# Patient Record
Sex: FEMALE | Race: WHITE | URBAN - METROPOLITAN AREA
[De-identification: names, ages, dates, MRNs, and addresses within clinical notes are randomized per-mention and may not be internally consistent; named-entity substitution may affect disease eponyms.]

---

## 2018-11-17 ENCOUNTER — OUTPATIENT (OUTPATIENT)
Dept: OUTPATIENT SERVICES | Facility: HOSPITAL | Age: 63
LOS: 1 days | Discharge: HOME | End: 2018-11-17

## 2018-11-17 DIAGNOSIS — E55.9 VITAMIN D DEFICIENCY, UNSPECIFIED: ICD-10-CM

## 2018-11-17 DIAGNOSIS — D64.9 ANEMIA, UNSPECIFIED: ICD-10-CM

## 2018-11-17 DIAGNOSIS — N18.2 CHRONIC KIDNEY DISEASE, STAGE 2 (MILD): ICD-10-CM

## 2018-11-17 DIAGNOSIS — E78.00 PURE HYPERCHOLESTEROLEMIA, UNSPECIFIED: ICD-10-CM

## 2018-11-17 DIAGNOSIS — K76.89 OTHER SPECIFIED DISEASES OF LIVER: ICD-10-CM

## 2018-11-17 DIAGNOSIS — N39.0 URINARY TRACT INFECTION, SITE NOT SPECIFIED: ICD-10-CM

## 2018-11-17 DIAGNOSIS — E11.9 TYPE 2 DIABETES MELLITUS WITHOUT COMPLICATIONS: ICD-10-CM

## 2018-12-14 ENCOUNTER — OUTPATIENT (OUTPATIENT)
Dept: OUTPATIENT SERVICES | Facility: HOSPITAL | Age: 63
LOS: 1 days | Discharge: HOME | End: 2018-12-14

## 2018-12-14 DIAGNOSIS — Z12.31 ENCOUNTER FOR SCREENING MAMMOGRAM FOR MALIGNANT NEOPLASM OF BREAST: ICD-10-CM

## 2019-12-21 ENCOUNTER — OUTPATIENT (OUTPATIENT)
Dept: OUTPATIENT SERVICES | Facility: HOSPITAL | Age: 64
LOS: 1 days | Discharge: HOME | End: 2019-12-21

## 2019-12-21 DIAGNOSIS — K76.89 OTHER SPECIFIED DISEASES OF LIVER: ICD-10-CM

## 2019-12-21 DIAGNOSIS — D64.9 ANEMIA, UNSPECIFIED: ICD-10-CM

## 2019-12-21 DIAGNOSIS — E55.9 VITAMIN D DEFICIENCY, UNSPECIFIED: ICD-10-CM

## 2019-12-21 DIAGNOSIS — N18.2 CHRONIC KIDNEY DISEASE, STAGE 2 (MILD): ICD-10-CM

## 2019-12-21 DIAGNOSIS — N39.0 URINARY TRACT INFECTION, SITE NOT SPECIFIED: ICD-10-CM

## 2019-12-21 DIAGNOSIS — E78.00 PURE HYPERCHOLESTEROLEMIA, UNSPECIFIED: ICD-10-CM

## 2020-01-15 ENCOUNTER — OUTPATIENT (OUTPATIENT)
Dept: OUTPATIENT SERVICES | Facility: HOSPITAL | Age: 65
LOS: 1 days | Discharge: HOME | End: 2020-01-15
Payer: COMMERCIAL

## 2020-01-15 DIAGNOSIS — Z12.31 ENCOUNTER FOR SCREENING MAMMOGRAM FOR MALIGNANT NEOPLASM OF BREAST: ICD-10-CM

## 2020-01-15 PROCEDURE — 77067 SCR MAMMO BI INCL CAD: CPT | Mod: 26

## 2020-01-15 PROCEDURE — 77063 BREAST TOMOSYNTHESIS BI: CPT | Mod: 26

## 2020-12-07 ENCOUNTER — EMERGENCY (EMERGENCY)
Facility: HOSPITAL | Age: 65
LOS: 0 days | Discharge: HOME | End: 2020-12-07
Attending: EMERGENCY MEDICINE | Admitting: EMERGENCY MEDICINE
Payer: COMMERCIAL

## 2020-12-07 VITALS
RESPIRATION RATE: 18 BRPM | SYSTOLIC BLOOD PRESSURE: 178 MMHG | OXYGEN SATURATION: 96 % | TEMPERATURE: 98 F | HEART RATE: 82 BPM | DIASTOLIC BLOOD PRESSURE: 76 MMHG
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
## 2020-12-07 DIAGNOSIS — Z23 ENCOUNTER FOR IMMUNIZATION: ICD-10-CM

## 2020-12-07 DIAGNOSIS — S61.214A LACERATION WITHOUT FOREIGN BODY OF RIGHT RING FINGER WITHOUT DAMAGE TO NAIL, INITIAL ENCOUNTER: ICD-10-CM
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
## 2020-12-07 PROCEDURE — 99283 EMERGENCY DEPT VISIT LOW MDM: CPT | Mod: 25

## 2020-12-07 PROCEDURE — 12001 RPR S/N/AX/GEN/TRNK 2.5CM/<: CPT

## 2020-12-07 RX ORDER — TETANUS TOXOID, REDUCED DIPHTHERIA TOXOID AND ACELLULAR PERTUSSIS VACCINE, ADSORBED 5; 2.5; 8; 8; 2.5 [IU]/.5ML; [IU]/.5ML; UG/.5ML; UG/.5ML; UG/.5ML
0.5 SUSPENSION INTRAMUSCULAR ONCE
Refills: 0 | Status: COMPLETED | OUTPATIENT
Start: 2020-12-07 | End: 2020-12-07

## 2020-12-07 RX ADMIN — TETANUS TOXOID, REDUCED DIPHTHERIA TOXOID AND ACELLULAR PERTUSSIS VACCINE, ADSORBED 0.5 MILLILITER(S): 5; 2.5; 8; 8; 2.5 SUSPENSION INTRAMUSCULAR at 12:18

## 2020-12-07 NOTE — ED PROVIDER NOTE - NSFOLLOWUPINSTRUCTIONS_ED_ALL_ED_FT
YOU WERE SEEN IN THE EMERGENCY DEPARTMENT FOR LACERATION OF YOUR FINGER AND RECEIVED A TOTAL OF 4 SUTURES TO CLOSE THE WOUND. PLEASE SEE YOUR PCP OR RETURN TO THE ED IN 7-10 DAYS TO HAVE THE SUTURES REMOVED. THANK YOU.  --------------------------------  Laceration    A laceration is a cut that goes through all of the layers of the skin and into the tissue that is right under the skin. Some lacerations heal on their own. Others need to be closed with stitches (sutures), staples, skin adhesive strips, or skin glue. Proper laceration care minimizes the risk of infection and helps the laceration to heal better.     SEEK IMMEDIATE MEDICAL CARE IF YOU HAVE THE FOLLOWING SYMPTOMS: swelling around the wound, worsening pain, drainage from the wound, red streaking going away from your wound, inability to move finger or toe near the laceration, or discoloration of skin near the laceration.

## 2020-12-07 NOTE — ED PROVIDER NOTE - OBJECTIVE STATEMENT
Pt is a 65F with no significant pmhx presenting with laceration. Pt states she was putting away her zoodle (zucchini noodle) maker which is essentially a cheese grater this morning when she received a small, clean, simple laceration to her right 4th digit distal phalanx on the volar medial aspect. Pt irrigated lac at home vigorously and applied pressure using clean paper towels for 2h but states that there was still a small amount of oozing blood from the laceration and her  told her she needed stitches, prompting her presentation to the ED. No numbness tingling, full ROM, no other lacerations, pt feels well with minimal serosanguinous seepage at this point.

## 2020-12-07 NOTE — ED PROVIDER NOTE - PHYSICAL EXAMINATION
CONSTITUTIONAL: Well-developed; well-nourished; in no acute distress.   SKIN: warm, dry  HEAD: Normocephalic; atraumatic.  EYES: PERRL, EOMI, normal sclera and conjunctiva   ENT: No nasal discharge; airway clear.  NECK: Supple; non tender.  CARD: S1, S2 normal; no murmurs, gallops, or rubs. Regular rate and rhythm.   RESP: No wheezes, rales or rhonchi.  ABD: soft ntnd  EXT: + R 4th digit distal phalanx ventrolateral superficial laceration in Z shape, approx 1.5cm long, no tendon involvement, full ROM, no numbness tingling, with active serosanguinous drainage from wound. Clean, no foreign body, wound base visualized. Normal ROM.  No clubbing, cyanosis or edema.   LYMPH: No acute cervical adenopathy.  NEURO: Alert, oriented, grossly unremarkable  PSYCH: Cooperative, appropriate.

## 2020-12-07 NOTE — ED PROVIDER NOTE - NS ED ROS FT
Eyes:  No visual changes, eye pain or discharge.  ENMT:  No hearing changes, pain, no sore throat or runny nose, no difficulty swallowing  Cardiac:  No chest pain, SOB or edema. No chest pain with exertion.  Respiratory:  No cough or respiratory distress. No hemoptysis. No history of asthma or RAD.  GI:  No nausea, vomiting, diarrhea or abdominal pain.  :  No dysuria, frequency or burning.  MS:  + right 4th digit distal phalanx laceration, bleeding, minimal pain. No numbness/tingling, full ROM. No myalgia, muscle weakness, joint pain or back pain.  Neuro:  No headache or weakness.  No LOC.  Skin:  No skin rash.   Endocrine: No history of thyroid disease or diabetes.

## 2020-12-07 NOTE — ED PROVIDER NOTE - CARE PLAN
Principal Discharge DX:	Laceration of right ring finger without foreign body without damage to nail, initial encounter   Principal Discharge DX:	Laceration of right ring finger without foreign body without damage to nail, initial encounter  Secondary Diagnosis:	Need for Tdap vaccination

## 2020-12-07 NOTE — ED PROVIDER NOTE - PATIENT PORTAL LINK FT
You can access the FollowMyHealth Patient Portal offered by [facility] by registering at the following website: [email or web address]. By joining [facility]’s FollowMyHealth portal, you will also be able to view your health information using other applications (apps) compatible with our system.

## 2020-12-07 NOTE — ED PROVIDER NOTE - PROGRESS NOTE DETAILS
TD: lac closed with 4x 5-0 prolene simple interrupted sutures after digital block using 1% lido without epi. Pt tolerated procedure well, received tetanus shot, will f/u with pcp or in ED for suture removal in 7-10 days. Pt indicates understanding and agreement with the plan and is ready for discharge. Attending Note: 66 y/o F presents to ED with laceration to the right 4th finger sustained while using a zoodle maker. Tetanus not UTD.  On exam: (+) superficial laceration to medial aspect of right 4th finger, FROM of dip joint.  Plan: Will repair laceration and update tetanus.

## 2020-12-08 NOTE — ED PROCEDURE NOTE - CPROC ED POST PROC CARE GUIDE1
Elevate the injured extremity as instructed./Verbal/written post procedure instructions were given to patient/caregiver./Instructed patient/caregiver to follow-up with primary care physician./Instructed patient/caregiver regarding signs and symptoms of infection./Keep the cast/splint/dressing clean and dry.
Instructed patient/caregiver to follow-up with primary care physician./Keep the cast/splint/dressing clean and dry./Verbal/written post procedure instructions were given to patient/caregiver./Instructed patient/caregiver regarding signs and symptoms of infection.

## 2021-10-05 ENCOUNTER — OUTPATIENT (OUTPATIENT)
Dept: OUTPATIENT SERVICES | Facility: HOSPITAL | Age: 66
LOS: 1 days | Discharge: HOME | End: 2021-10-05
Payer: COMMERCIAL

## 2021-10-05 DIAGNOSIS — Z12.31 ENCOUNTER FOR SCREENING MAMMOGRAM FOR MALIGNANT NEOPLASM OF BREAST: ICD-10-CM

## 2021-10-05 PROCEDURE — 77067 SCR MAMMO BI INCL CAD: CPT | Mod: 26

## 2021-10-05 PROCEDURE — 77063 BREAST TOMOSYNTHESIS BI: CPT | Mod: 26

## 2022-04-15 NOTE — ED PROVIDER NOTE - CARE PROVIDER_API CALL
Gerard Castellon  [address] [facility]  [address] [place] [address]  Phone: [phone]  Fax: [phone]  Established Patient  Follow Up Time: 7-10 Days   You can access the FollowMyHealth Patient Portal offered by [facility] by registering at the following website: [email or web address]. By joining [facility]’s FollowMyHealth portal, you will also be able to view your health information using other applications (apps) compatible with our system.

## 2022-09-06 PROBLEM — Z00.00 ENCOUNTER FOR PREVENTIVE HEALTH EXAMINATION: Status: ACTIVE | Noted: 2022-09-06

## 2022-09-12 ENCOUNTER — LABORATORY RESULT (OUTPATIENT)
Age: 67
End: 2022-09-12

## 2022-09-13 ENCOUNTER — APPOINTMENT (OUTPATIENT)
Dept: OBGYN | Facility: CLINIC | Age: 67
End: 2022-09-13

## 2022-09-13 ENCOUNTER — NON-APPOINTMENT (OUTPATIENT)
Age: 67
End: 2022-09-13

## 2022-09-13 VITALS
WEIGHT: 210 LBS | SYSTOLIC BLOOD PRESSURE: 138 MMHG | DIASTOLIC BLOOD PRESSURE: 85 MMHG | BODY MASS INDEX: 37.21 KG/M2 | HEIGHT: 63 IN | TEMPERATURE: 98.6 F | HEART RATE: 111 BPM

## 2022-09-13 DIAGNOSIS — N89.8 OTHER SPECIFIED NONINFLAMMATORY DISORDERS OF VAGINA: ICD-10-CM

## 2022-09-13 DIAGNOSIS — H35.30 UNSPECIFIED MACULAR DEGENERATION: ICD-10-CM

## 2022-09-13 DIAGNOSIS — Z80.3 FAMILY HISTORY OF MALIGNANT NEOPLASM OF BREAST: ICD-10-CM

## 2022-09-13 LAB
BILIRUB UR QL STRIP: NEGATIVE
CLARITY UR: CLEAR
COLLECTION METHOD: NORMAL
GLUCOSE UR-MCNC: NEGATIVE
HCG UR QL: 0.2 EU/DL
HGB UR QL STRIP.AUTO: NORMAL
KETONES UR-MCNC: NEGATIVE
LEUKOCYTE ESTERASE UR QL STRIP: NEGATIVE
NITRITE UR QL STRIP: NEGATIVE
PH UR STRIP: 5.5
PROT UR STRIP-MCNC: NEGATIVE
SP GR UR STRIP: 1.02

## 2022-09-13 PROCEDURE — 81003 URINALYSIS AUTO W/O SCOPE: CPT | Mod: QW

## 2022-09-13 PROCEDURE — 99387 INIT PM E/M NEW PAT 65+ YRS: CPT

## 2022-09-13 NOTE — HISTORY OF PRESENT ILLNESS
[postmenopausal] : postmenopausal [Y] : Positive pregnancy history [Currently In Menopause] : currently in menopause [Post-Menopause, No Sxs] : post-menopausal, currently without symptoms [Menopause Age: ____] : age at menopause was [unfilled] [FreeTextEntry1] : new pt, annual exam\par pt thinks her bladder is prolapsed [TextBox_102] : hyst 2014   [PGxTotal] : 1 [[facility]] : 1

## 2022-09-13 NOTE — PHYSICAL EXAM
[Chaperone Present] : A chaperone was present in the examining room during all aspects of the physical examination [Appropriately responsive] : appropriately responsive [Alert] : alert [No Acute Distress] : no acute distress [Oriented x3] : oriented x3 [Examination Of The Breasts] : a normal appearance [No Masses] : no breast masses were palpable [Vulvar Atrophy] : vulvar atrophy [Labia Majora] : normal [Labia Minora] : normal [Normal] : normal [Atrophy] : atrophy [Absent] : absent [Uterine Adnexae] : absent [Discharge] : a  ~M vaginal discharge was present [Heavy] : heavy [White] : white [Thick] : thick

## 2022-09-15 LAB
APPEARANCE: ABNORMAL
BACTERIA UR CULT: NORMAL
BILIRUBIN URINE: NEGATIVE
BLOOD URINE: NEGATIVE
COLOR: NORMAL
GLUCOSE QUALITATIVE U: NEGATIVE
KETONES URINE: NORMAL
LEUKOCYTE ESTERASE URINE: NEGATIVE
NITRITE URINE: NEGATIVE
PH URINE: 6
PROTEIN URINE: NEGATIVE
SPECIFIC GRAVITY URINE: 1.02
UROBILINOGEN URINE: NORMAL

## 2022-09-22 LAB — CYTOLOGY CVX/VAG DOC THIN PREP: NORMAL

## 2022-09-29 ENCOUNTER — APPOINTMENT (OUTPATIENT)
Dept: GASTROENTEROLOGY | Facility: CLINIC | Age: 67
End: 2022-09-29

## 2022-10-12 ENCOUNTER — OUTPATIENT (OUTPATIENT)
Dept: OUTPATIENT SERVICES | Facility: HOSPITAL | Age: 67
LOS: 1 days | Discharge: HOME | End: 2022-10-12

## 2022-10-12 ENCOUNTER — RESULT REVIEW (OUTPATIENT)
Age: 67
End: 2022-10-12

## 2022-10-12 DIAGNOSIS — Z12.31 ENCOUNTER FOR SCREENING MAMMOGRAM FOR MALIGNANT NEOPLASM OF BREAST: ICD-10-CM

## 2022-10-12 PROCEDURE — 77067 SCR MAMMO BI INCL CAD: CPT | Mod: 26

## 2022-10-12 PROCEDURE — 77063 BREAST TOMOSYNTHESIS BI: CPT | Mod: 26

## 2022-11-08 ENCOUNTER — TRANSCRIPTION ENCOUNTER (OUTPATIENT)
Age: 67
End: 2022-11-08

## 2022-11-08 ENCOUNTER — OUTPATIENT (OUTPATIENT)
Dept: OUTPATIENT SERVICES | Facility: HOSPITAL | Age: 67
LOS: 1 days | Discharge: HOME | End: 2022-11-08

## 2022-11-08 VITALS
SYSTOLIC BLOOD PRESSURE: 147 MMHG | HEART RATE: 76 BPM | RESPIRATION RATE: 18 BRPM | OXYGEN SATURATION: 98 % | DIASTOLIC BLOOD PRESSURE: 60 MMHG

## 2022-11-08 VITALS
RESPIRATION RATE: 18 BRPM | DIASTOLIC BLOOD PRESSURE: 65 MMHG | SYSTOLIC BLOOD PRESSURE: 146 MMHG | HEIGHT: 63 IN | TEMPERATURE: 97 F | WEIGHT: 207.9 LBS | HEART RATE: 94 BPM

## 2022-11-08 DIAGNOSIS — Z90.710 ACQUIRED ABSENCE OF BOTH CERVIX AND UTERUS: Chronic | ICD-10-CM

## 2022-11-08 DIAGNOSIS — Z98.891 HISTORY OF UTERINE SCAR FROM PREVIOUS SURGERY: Chronic | ICD-10-CM

## 2022-11-08 NOTE — ASU DISCHARGE PLAN (ADULT/PEDIATRIC) - NS MD DC FALL RISK RISK
For information on Fall & Injury Prevention, visit: [email or web address] OR  [email or web address] OR  https://www.cdc.gov/steadi/patient.html

## 2022-11-08 NOTE — ASU PATIENT PROFILE, ADULT - FALL HARM RISK - UNIVERSAL INTERVENTIONS
Bed in lowest position, wheels locked, appropriate side rails in place/Call bell, personal items and telephone in reach/Instruct patient to call for assistance before getting out of bed or chair/Non-slip footwear when patient is out of bed/[place] to call system/Physically safe environment - no spills, clutter or unnecessary equipment/Purposeful Proactive Rounding/Room/bathroom lighting operational, light cord in reach

## 2022-11-10 DIAGNOSIS — Z12.11 ENCOUNTER FOR SCREENING FOR MALIGNANT NEOPLASM OF COLON: ICD-10-CM

## 2022-11-10 DIAGNOSIS — Z90.710 ACQUIRED ABSENCE OF BOTH CERVIX AND UTERUS: ICD-10-CM

## 2022-11-10 DIAGNOSIS — K57.30 DIVERTICULOSIS OF LARGE INTESTINE WITHOUT PERFORATION OR ABSCESS WITHOUT BLEEDING: ICD-10-CM

## 2022-11-10 DIAGNOSIS — K64.8 OTHER HEMORRHOIDS: ICD-10-CM

## 2023-06-29 PROBLEM — H35.30 UNSPECIFIED MACULAR DEGENERATION: Chronic | Status: ACTIVE | Noted: 2022-11-08

## 2023-06-29 PROBLEM — L71.9 ROSACEA, UNSPECIFIED: Chronic | Status: ACTIVE | Noted: 2022-11-08

## 2023-08-29 ENCOUNTER — APPOINTMENT (OUTPATIENT)
Dept: OPHTHALMOLOGY | Facility: CLINIC | Age: 68
End: 2023-08-29
Payer: MEDICARE

## 2023-08-29 ENCOUNTER — OUTPATIENT (OUTPATIENT)
Dept: OUTPATIENT SERVICES | Facility: HOSPITAL | Age: 68
LOS: 1 days | End: 2023-08-29
Payer: MEDICARE

## 2023-08-29 DIAGNOSIS — H25.89 OTHER AGE-RELATED CATARACT: ICD-10-CM

## 2023-08-29 DIAGNOSIS — Z98.891 HISTORY OF UTERINE SCAR FROM PREVIOUS SURGERY: Chronic | ICD-10-CM

## 2023-08-29 DIAGNOSIS — Z90.710 ACQUIRED ABSENCE OF BOTH CERVIX AND UTERUS: Chronic | ICD-10-CM

## 2023-08-29 PROCEDURE — 76516 ECHO EXAM OF EYE: CPT

## 2023-08-29 PROCEDURE — 76519 ECHO EXAM OF EYE: CPT | Mod: 26

## 2023-09-05 DIAGNOSIS — H25.13 AGE-RELATED NUCLEAR CATARACT, BILATERAL: ICD-10-CM

## 2023-09-25 ENCOUNTER — OUTPATIENT (OUTPATIENT)
Dept: OUTPATIENT SERVICES | Facility: HOSPITAL | Age: 68
LOS: 1 days | Discharge: HOME | End: 2023-09-25
Payer: MEDICARE

## 2023-09-25 VITALS
WEIGHT: 205.03 LBS | SYSTOLIC BLOOD PRESSURE: 135 MMHG | HEART RATE: 104 BPM | OXYGEN SATURATION: 99 % | RESPIRATION RATE: 17 BRPM | TEMPERATURE: 96 F | HEIGHT: 63 IN | DIASTOLIC BLOOD PRESSURE: 71 MMHG

## 2023-09-25 VITALS — HEART RATE: 83 BPM | SYSTOLIC BLOOD PRESSURE: 138 MMHG | RESPIRATION RATE: 15 BRPM | DIASTOLIC BLOOD PRESSURE: 64 MMHG

## 2023-09-25 DIAGNOSIS — Z98.891 HISTORY OF UTERINE SCAR FROM PREVIOUS SURGERY: Chronic | ICD-10-CM

## 2023-09-25 DIAGNOSIS — H25.041 POSTERIOR SUBCAPSULAR POLAR AGE-RELATED CATARACT, RIGHT EYE: ICD-10-CM

## 2023-09-25 DIAGNOSIS — Z90.710 ACQUIRED ABSENCE OF BOTH CERVIX AND UTERUS: Chronic | ICD-10-CM

## 2023-09-25 PROCEDURE — V2632: CPT

## 2023-09-25 NOTE — ASU PATIENT PROFILE, ADULT - PAIN SCALE PREFERRED, PROFILE
Patient called to ask the best way to remove tegaderm. Writer suggested mild soap and warm water. He stated otherwise he has no pain, everything is going well. He is aware to call us with further questions.   none

## 2023-09-25 NOTE — ASU PATIENT PROFILE, ADULT - FALL HARM RISK - UNIVERSAL INTERVENTIONS
Bed in lowest position, wheels locked, appropriate side rails in place/Call bell, personal items and telephone in reach/Instruct patient to call for assistance before getting out of bed or chair/Non-slip footwear when patient is out of bed/[place] to call system/Physically safe environment - no spills, clutter or unnecessary equipment/Purposeful Proactive Rounding/Room/bathroom lighting operational, light cord in reach

## 2023-09-27 DIAGNOSIS — H26.8 OTHER SPECIFIED CATARACT: ICD-10-CM

## 2023-09-27 DIAGNOSIS — Z90.710 ACQUIRED ABSENCE OF BOTH CERVIX AND UTERUS: ICD-10-CM

## 2023-10-23 ENCOUNTER — APPOINTMENT (OUTPATIENT)
Dept: OBGYN | Facility: CLINIC | Age: 68
End: 2023-10-23
Payer: MEDICARE

## 2023-10-23 VITALS
HEIGHT: 63 IN | DIASTOLIC BLOOD PRESSURE: 82 MMHG | BODY MASS INDEX: 37.03 KG/M2 | SYSTOLIC BLOOD PRESSURE: 129 MMHG | HEART RATE: 105 BPM | WEIGHT: 209 LBS

## 2023-10-23 DIAGNOSIS — Z12.39 ENCOUNTER FOR OTHER SCREENING FOR MALIGNANT NEOPLASM OF BREAST: ICD-10-CM

## 2023-10-23 DIAGNOSIS — Z78.9 OTHER SPECIFIED HEALTH STATUS: ICD-10-CM

## 2023-10-23 DIAGNOSIS — L30.4 ERYTHEMA INTERTRIGO: ICD-10-CM

## 2023-10-23 DIAGNOSIS — Z01.419 ENCOUNTER FOR GYNECOLOGICAL EXAMINATION (GENERAL) (ROUTINE) W/OUT ABNORMAL FINDINGS: ICD-10-CM

## 2023-10-23 PROCEDURE — G0101: CPT

## 2023-10-23 PROCEDURE — 81003 URINALYSIS AUTO W/O SCOPE: CPT | Mod: QW

## 2023-10-23 PROCEDURE — G0402 INITIAL PREVENTIVE EXAM: CPT

## 2023-10-23 PROCEDURE — G0439: CPT

## 2023-10-23 RX ORDER — CLOTRIMAZOLE AND BETAMETHASONE DIPROPIONATE 10; .5 MG/G; MG/G
1-0.05 CREAM TOPICAL
Qty: 1 | Refills: 1 | Status: ACTIVE | COMMUNITY
Start: 2023-10-23 | End: 1900-01-01

## 2023-10-23 RX ORDER — METRONIDAZOLE 7.5 MG/G
CREAM TOPICAL
Refills: 0 | Status: ACTIVE | COMMUNITY

## 2023-10-27 ENCOUNTER — RESULT REVIEW (OUTPATIENT)
Age: 68
End: 2023-10-27

## 2023-10-27 ENCOUNTER — OUTPATIENT (OUTPATIENT)
Dept: OUTPATIENT SERVICES | Facility: HOSPITAL | Age: 68
LOS: 1 days | End: 2023-10-27
Payer: MEDICARE

## 2023-10-27 DIAGNOSIS — Z12.31 ENCOUNTER FOR SCREENING MAMMOGRAM FOR MALIGNANT NEOPLASM OF BREAST: ICD-10-CM

## 2023-10-27 DIAGNOSIS — Z98.891 HISTORY OF UTERINE SCAR FROM PREVIOUS SURGERY: Chronic | ICD-10-CM

## 2023-10-27 DIAGNOSIS — Z90.710 ACQUIRED ABSENCE OF BOTH CERVIX AND UTERUS: Chronic | ICD-10-CM

## 2023-10-27 LAB — CYTOLOGY CVX/VAG DOC THIN PREP: NORMAL

## 2023-10-27 PROCEDURE — 77067 SCR MAMMO BI INCL CAD: CPT | Mod: 26

## 2023-10-27 PROCEDURE — 77067 SCR MAMMO BI INCL CAD: CPT

## 2023-10-27 PROCEDURE — 77063 BREAST TOMOSYNTHESIS BI: CPT

## 2023-10-27 PROCEDURE — 77063 BREAST TOMOSYNTHESIS BI: CPT | Mod: 26

## 2023-10-28 DIAGNOSIS — Z12.31 ENCOUNTER FOR SCREENING MAMMOGRAM FOR MALIGNANT NEOPLASM OF BREAST: ICD-10-CM

## 2023-12-04 ENCOUNTER — OUTPATIENT (OUTPATIENT)
Dept: OUTPATIENT SERVICES | Facility: HOSPITAL | Age: 68
LOS: 1 days | Discharge: HOME | End: 2023-12-04
Payer: MEDICARE

## 2023-12-04 VITALS — SYSTOLIC BLOOD PRESSURE: 119 MMHG | RESPIRATION RATE: 15 BRPM | DIASTOLIC BLOOD PRESSURE: 70 MMHG | HEART RATE: 94 BPM

## 2023-12-04 VITALS
OXYGEN SATURATION: 94 % | RESPIRATION RATE: 17 BRPM | TEMPERATURE: 97 F | SYSTOLIC BLOOD PRESSURE: 120 MMHG | WEIGHT: 205.03 LBS | HEART RATE: 101 BPM | HEIGHT: 63 IN | DIASTOLIC BLOOD PRESSURE: 61 MMHG

## 2023-12-04 DIAGNOSIS — Z98.891 HISTORY OF UTERINE SCAR FROM PREVIOUS SURGERY: Chronic | ICD-10-CM

## 2023-12-04 DIAGNOSIS — Z98.890 OTHER SPECIFIED POSTPROCEDURAL STATES: Chronic | ICD-10-CM

## 2023-12-04 DIAGNOSIS — Z90.710 ACQUIRED ABSENCE OF BOTH CERVIX AND UTERUS: Chronic | ICD-10-CM

## 2023-12-04 DIAGNOSIS — H25.22 AGE-RELATED CATARACT, MORGAGNIAN TYPE, LEFT EYE: ICD-10-CM

## 2023-12-04 PROCEDURE — V2632: CPT

## 2023-12-04 RX ORDER — METRONIDAZOLE 7.5 MG/G
1 GEL VAGINAL
Qty: 0 | Refills: 0 | DISCHARGE

## 2023-12-04 RX ORDER — MULTIVIT-MIN/FERROUS GLUCONATE 9 MG/15 ML
0 LIQUID (ML) ORAL
Qty: 0 | Refills: 0 | DISCHARGE

## 2023-12-04 NOTE — ASU PATIENT PROFILE, ADULT - FALL HARM RISK - UNIVERSAL INTERVENTIONS
Bed in lowest position, wheels locked, appropriate side rails in place/Call bell, personal items and telephone in reach/Instruct patient to call for assistance before getting out of bed or chair/Non-slip footwear when patient is out of bed/[place] to call system/Physically safe environment - no spills, clutter or unnecessary equipment/Purposeful Proactive Rounding/Room/bathroom lighting operational, light cord in reach Bed in lowest position, wheels locked, appropriate side rails in place/Call bell, personal items and telephone in reach/Instruct patient to call for assistance before getting out of bed or chair/Non-slip footwear when patient is out of bed/[place] to call system/Physically safe environment - no spills, clutter or unnecessary equipment/Purposeful Proactive Rounding/Room/bathroom lighting operational, light cord in reach

## 2023-12-04 NOTE — ASU DISCHARGE PLAN (ADULT/PEDIATRIC) - NS MD DC FALL RISK RISK
For information on Fall & Injury Prevention, visit: [email or web address] OR  [email or web address] OR  https://www.cdc.gov/steadi/patient.html For information on Fall & Injury Prevention, visit: [email or web address] OR  [email or web address] OR  https://www.cdc.gov/steadi/patient.html

## 2023-12-04 NOTE — ASU PATIENT PROFILE, ADULT - NSICDXPASTSURGICALHX_GEN_ALL_CORE_FT
PAST SURGICAL HISTORY:  H/O total hysterectomy     History of      History of surgery RIGHT EYE CATARACT EXTRACTION WITH LENS IMPLANT

## 2023-12-04 NOTE — CHART NOTE - NSCHARTNOTEFT_GEN_A_CORE
PACU ANESTHESIA ADMISSION NOTE      Procedure: Left eye cataract extraction  Post op diagnosis:  Left eye cataract    __x__  Patent Airway    __x__  Full return of protective reflexes    __x__  Full recovery from anesthesia / back to baseline status    Vitals:  T(C): 35.9 (12-04-23 @ 07:42), Max: 35.9 (12-04-23 @ 07:25)  HR: 94 (12-04-23 @ 09:07) (94 - 101)  BP: 119/70 (12-04-23 @ 09:07) (119/70 - 120/61)  RR: 15 (12-04-23 @ 09:07) (15 - 17)  SpO2: 94% (12-04-23 @ 07:42) (94% - 94%)    Mental Status:  __x__ Awake   ___x__ Alert   _____ Drowsy   _____ Sedated    Nausea/Vomiting:  __x__ NO  ______Yes,   See Post - Op Orders          Pain Scale (0-10):  __0___    Treatment: ____ None    __x__ See Post - Op/PCA Orders    Post - Operative Fluids:   ____ Oral   __x__ See Post - Op Orders    Plan: Discharge:   _x___Home       _____Floor     _____Critical Care    _____  Other:_________________    Comments: Patient had smooth intraoperative event, no anesthesia complication.  PACU Vital signs: HR: 88           BP:        119/70          RR: 16            O2 Sat:       96%

## 2023-12-06 DIAGNOSIS — H25.12 AGE-RELATED NUCLEAR CATARACT, LEFT EYE: ICD-10-CM

## 2023-12-06 DIAGNOSIS — H35.30 UNSPECIFIED MACULAR DEGENERATION: ICD-10-CM

## 2023-12-12 NOTE — ED PROCEDURE NOTE - CPROC ED TIME OUT STATEMENT1
Patient presents for a dental restoration and verbally consents for treatment:  Reviewed health history-  Pt is ASA type I  Treatment consents signed: Yes  Perio: Healthy  Pain Scale: 0  Caries Assessment: High    Radiographs: Films are current  Oral Hygiene instruction reviewed and given  Hygiene recall visits recommended to the patient    Patient agrees with the diagnosis of Caries and the proposed treatment plan for the resin restoration:  Tooth ##30 (OB)and #31(O)  Dental Anesthesia:  No.  Material:   Etch Ivoclar bond and resin   Shade: Shade A2  Post op instructions given  Prognosis is Good.    Referrals Needed: No  Next visit: Maryann Sherwood
“Patient's name, , procedure and correct site were confirmed during the [place] Timeout.”
“Patient's name, , procedure and correct site were confirmed during the [place] Timeout.”

## 2024-01-14 ENCOUNTER — NON-APPOINTMENT (OUTPATIENT)
Age: 69
End: 2024-01-14

## 2024-09-19 ENCOUNTER — INPATIENT (INPATIENT)
Facility: HOSPITAL | Age: 69
LOS: 5 days | Discharge: HOME HEALTH | DRG: 293 | End: 2024-09-25
Attending: STUDENT IN AN ORGANIZED HEALTH CARE EDUCATION/TRAINING PROGRAM | Admitting: STUDENT IN AN ORGANIZED HEALTH CARE EDUCATION/TRAINING PROGRAM
Payer: MEDICARE

## 2024-09-19 VITALS
RESPIRATION RATE: 18 BRPM | DIASTOLIC BLOOD PRESSURE: 72 MMHG | TEMPERATURE: 98 F | HEIGHT: 63 IN | OXYGEN SATURATION: 97 % | HEART RATE: 105 BPM | WEIGHT: 197.98 LBS | SYSTOLIC BLOOD PRESSURE: 118 MMHG

## 2024-09-19 DIAGNOSIS — I50.9 HEART FAILURE, UNSPECIFIED: ICD-10-CM

## 2024-09-19 DIAGNOSIS — Z90.710 ACQUIRED ABSENCE OF BOTH CERVIX AND UTERUS: Chronic | ICD-10-CM

## 2024-09-19 DIAGNOSIS — Z98.891 HISTORY OF UTERINE SCAR FROM PREVIOUS SURGERY: Chronic | ICD-10-CM

## 2024-09-19 DIAGNOSIS — Z98.890 OTHER SPECIFIED POSTPROCEDURAL STATES: Chronic | ICD-10-CM

## 2024-09-19 LAB
ALBUMIN SERPL ELPH-MCNC: 4.2 G/DL — SIGNIFICANT CHANGE UP (ref 3.5–5.2)
ALP SERPL-CCNC: 105 U/L — SIGNIFICANT CHANGE UP (ref 30–115)
ALT FLD-CCNC: 37 U/L — SIGNIFICANT CHANGE UP (ref 0–41)
ANION GAP SERPL CALC-SCNC: 12 MMOL/L — SIGNIFICANT CHANGE UP (ref 7–14)
ANISOCYTOSIS BLD QL: SLIGHT — SIGNIFICANT CHANGE UP
AST SERPL-CCNC: 30 U/L — SIGNIFICANT CHANGE UP (ref 0–41)
BASE EXCESS BLDV CALC-SCNC: -2 MMOL/L — SIGNIFICANT CHANGE UP (ref -2–3)
BASOPHILS # BLD AUTO: 0 K/UL — SIGNIFICANT CHANGE UP (ref 0–0.2)
BASOPHILS NFR BLD AUTO: 0 % — SIGNIFICANT CHANGE UP (ref 0–1)
BILIRUB SERPL-MCNC: 1.4 MG/DL — HIGH (ref 0.2–1.2)
BUN SERPL-MCNC: 17 MG/DL — SIGNIFICANT CHANGE UP (ref 10–20)
CA-I SERPL-SCNC: 1.27 MMOL/L — SIGNIFICANT CHANGE UP (ref 1.15–1.33)
CALCIUM SERPL-MCNC: 10 MG/DL — SIGNIFICANT CHANGE UP (ref 8.4–10.5)
CHLORIDE SERPL-SCNC: 106 MMOL/L — SIGNIFICANT CHANGE UP (ref 98–110)
CO2 SERPL-SCNC: 22 MMOL/L — SIGNIFICANT CHANGE UP (ref 17–32)
CREAT SERPL-MCNC: 0.9 MG/DL — SIGNIFICANT CHANGE UP (ref 0.7–1.5)
EGFR: 70 ML/MIN/1.73M2 — SIGNIFICANT CHANGE UP
EOSINOPHIL # BLD AUTO: 0 K/UL — SIGNIFICANT CHANGE UP (ref 0–0.7)
EOSINOPHIL NFR BLD AUTO: 0 % — SIGNIFICANT CHANGE UP (ref 0–8)
GAS PNL BLDV: 136 MMOL/L — SIGNIFICANT CHANGE UP (ref 136–145)
GAS PNL BLDV: SIGNIFICANT CHANGE UP
GAS PNL BLDV: SIGNIFICANT CHANGE UP
GIANT PLATELETS BLD QL SMEAR: PRESENT — SIGNIFICANT CHANGE UP
GLUCOSE SERPL-MCNC: 119 MG/DL — HIGH (ref 70–99)
HCO3 BLDV-SCNC: 23 MMOL/L — SIGNIFICANT CHANGE UP (ref 22–29)
HCT VFR BLD CALC: 42.5 % — SIGNIFICANT CHANGE UP (ref 37–47)
HCT VFR BLDA CALC: 42 % — SIGNIFICANT CHANGE UP (ref 34.5–46.5)
HGB BLD CALC-MCNC: 14 G/DL — SIGNIFICANT CHANGE UP (ref 11.7–16.1)
HGB BLD-MCNC: 13.7 G/DL — SIGNIFICANT CHANGE UP (ref 12–16)
LACTATE BLDV-MCNC: 1.8 MMOL/L — SIGNIFICANT CHANGE UP (ref 0.5–2)
LYMPHOCYTES # BLD AUTO: 1.52 K/UL — SIGNIFICANT CHANGE UP (ref 1.2–3.4)
LYMPHOCYTES # BLD AUTO: 23.5 % — SIGNIFICANT CHANGE UP (ref 20.5–51.1)
MACROCYTES BLD QL: SLIGHT — SIGNIFICANT CHANGE UP
MANUAL SMEAR VERIFICATION: SIGNIFICANT CHANGE UP
MCHC RBC-ENTMCNC: 32.2 G/DL — SIGNIFICANT CHANGE UP (ref 32–37)
MCHC RBC-ENTMCNC: 34.3 PG — HIGH (ref 27–31)
MCV RBC AUTO: 106.5 FL — HIGH (ref 81–99)
MONOCYTES # BLD AUTO: 0 K/UL — LOW (ref 0.1–0.6)
MONOCYTES NFR BLD AUTO: 0 % — LOW (ref 1.7–9.3)
NEUTROPHILS # BLD AUTO: 4.93 K/UL — SIGNIFICANT CHANGE UP (ref 1.4–6.5)
NEUTROPHILS NFR BLD AUTO: 76.5 % — HIGH (ref 42.2–75.2)
NT-PROBNP SERPL-SCNC: 6863 PG/ML — HIGH (ref 0–300)
OVALOCYTES BLD QL SMEAR: SLIGHT — SIGNIFICANT CHANGE UP
PCO2 BLDV: 40 MMHG — SIGNIFICANT CHANGE UP (ref 39–42)
PH BLDV: 7.37 — SIGNIFICANT CHANGE UP (ref 7.32–7.43)
PLAT MORPH BLD: NORMAL — SIGNIFICANT CHANGE UP
PLATELET # BLD AUTO: 243 K/UL — SIGNIFICANT CHANGE UP (ref 130–400)
PMV BLD: 10.3 FL — SIGNIFICANT CHANGE UP (ref 7.4–10.4)
PO2 BLDV: 28 MMHG — SIGNIFICANT CHANGE UP (ref 25–45)
POIKILOCYTOSIS BLD QL AUTO: SLIGHT — SIGNIFICANT CHANGE UP
POLYCHROMASIA BLD QL SMEAR: SLIGHT — SIGNIFICANT CHANGE UP
POTASSIUM BLDV-SCNC: 3.7 MMOL/L — SIGNIFICANT CHANGE UP (ref 3.5–5.1)
POTASSIUM SERPL-MCNC: 3.9 MMOL/L — SIGNIFICANT CHANGE UP (ref 3.5–5)
POTASSIUM SERPL-SCNC: 3.9 MMOL/L — SIGNIFICANT CHANGE UP (ref 3.5–5)
PROT SERPL-MCNC: 7 G/DL — SIGNIFICANT CHANGE UP (ref 6–8)
RBC # BLD: 3.99 M/UL — LOW (ref 4.2–5.4)
RBC # FLD: 18.5 % — HIGH (ref 11.5–14.5)
RBC BLD AUTO: ABNORMAL
SAO2 % BLDV: 39.4 % — LOW (ref 67–88)
SODIUM SERPL-SCNC: 140 MMOL/L — SIGNIFICANT CHANGE UP (ref 135–146)
TROPONIN T, HIGH SENSITIVITY RESULT: 14 NG/L — HIGH (ref 6–13)
TROPONIN T, HIGH SENSITIVITY RESULT: 15 NG/L — HIGH (ref 6–13)
WBC # BLD: 6.45 K/UL — SIGNIFICANT CHANGE UP (ref 4.8–10.8)
WBC # FLD AUTO: 6.45 K/UL — SIGNIFICANT CHANGE UP (ref 4.8–10.8)

## 2024-09-19 PROCEDURE — 93308 TTE F-UP OR LMTD: CPT | Mod: 26

## 2024-09-19 PROCEDURE — 84481 FREE ASSAY (FT-3): CPT

## 2024-09-19 PROCEDURE — 85025 COMPLETE CBC W/AUTO DIFF WBC: CPT

## 2024-09-19 PROCEDURE — 83036 HEMOGLOBIN GLYCOSYLATED A1C: CPT

## 2024-09-19 PROCEDURE — 76604 US EXAM CHEST: CPT | Mod: 26

## 2024-09-19 PROCEDURE — 76937 US GUIDE VASCULAR ACCESS: CPT | Mod: 26

## 2024-09-19 PROCEDURE — 84484 ASSAY OF TROPONIN QUANT: CPT

## 2024-09-19 PROCEDURE — C1894: CPT

## 2024-09-19 PROCEDURE — 36415 COLL VENOUS BLD VENIPUNCTURE: CPT

## 2024-09-19 PROCEDURE — 82728 ASSAY OF FERRITIN: CPT

## 2024-09-19 PROCEDURE — 80048 BASIC METABOLIC PNL TOTAL CA: CPT

## 2024-09-19 PROCEDURE — 84439 ASSAY OF FREE THYROXINE: CPT

## 2024-09-19 PROCEDURE — 99285 EMERGENCY DEPT VISIT HI MDM: CPT | Mod: FS

## 2024-09-19 PROCEDURE — 84443 ASSAY THYROID STIM HORMONE: CPT

## 2024-09-19 PROCEDURE — 80061 LIPID PANEL: CPT

## 2024-09-19 PROCEDURE — 83735 ASSAY OF MAGNESIUM: CPT

## 2024-09-19 PROCEDURE — 71046 X-RAY EXAM CHEST 2 VIEWS: CPT | Mod: 26

## 2024-09-19 PROCEDURE — 93010 ELECTROCARDIOGRAM REPORT: CPT | Mod: 76

## 2024-09-19 PROCEDURE — 82962 GLUCOSE BLOOD TEST: CPT

## 2024-09-19 PROCEDURE — 83550 IRON BINDING TEST: CPT

## 2024-09-19 PROCEDURE — 93005 ELECTROCARDIOGRAM TRACING: CPT

## 2024-09-19 PROCEDURE — 93306 TTE W/DOPPLER COMPLETE: CPT

## 2024-09-19 PROCEDURE — 99222 1ST HOSP IP/OBS MODERATE 55: CPT

## 2024-09-19 PROCEDURE — 93458 L HRT ARTERY/VENTRICLE ANGIO: CPT

## 2024-09-19 PROCEDURE — C1887: CPT

## 2024-09-19 PROCEDURE — C1769: CPT

## 2024-09-19 PROCEDURE — 85027 COMPLETE CBC AUTOMATED: CPT

## 2024-09-19 PROCEDURE — 83540 ASSAY OF IRON: CPT

## 2024-09-19 RX ORDER — FUROSEMIDE 10 MG/ML
40 INJECTION INTRAVENOUS DAILY
Refills: 0 | Status: DISCONTINUED | OUTPATIENT
Start: 2024-09-20 | End: 2024-09-22

## 2024-09-19 RX ORDER — FUROSEMIDE 10 MG/ML
40 INJECTION INTRAVENOUS ONCE
Refills: 0 | Status: COMPLETED | OUTPATIENT
Start: 2024-09-19 | End: 2024-09-19

## 2024-09-19 RX ADMIN — Medication 5000 UNIT(S): at 18:22

## 2024-09-19 RX ADMIN — FUROSEMIDE 40 MILLIGRAM(S): 10 INJECTION INTRAVENOUS at 15:08

## 2024-09-19 NOTE — H&P ADULT - NSHPLABSRESULTS_GEN_ALL_CORE
- Telemetry: continue to monitor   - ECG 9/19/24: bpm, TWI Inferolateral leads, wide QRS complex   - Echo: pending  - Radiology: 9/19/24 CXR No acute pulmonary process.     - Labs:                        13.7   6.45  )-----------( 243      ( 19 Sep 2024 12:11 )             42.5     09-19    140  |  106  |  17  ----------------------------<  119[H]  3.9   |  22  |  0.9    Ca    10.0      19 Sep 2024 12:11    TPro  7.0  /  Alb  4.2  /  TBili  1.4[H]  /  DBili  x   /  AST  30  /  ALT  37  /  AlkPhos  105  09-19    LIVER FUNCTIONS - ( 19 Sep 2024 12:11 )  Alb: 4.2 g/dL / Pro: 7.0 g/dL / ALK PHOS: 105 U/L / ALT: 37 U/L / AST: 30 U/L / GGT: x           Urinalysis Basic - ( 19 Sep 2024 12:11 )    Color: x / Appearance: x / SG: x / pH: x  Gluc: 119 mg/dL / Ketone: x  / Bili: x / Urobili: x   Blood: x / Protein: x / Nitrite: x   Leuk Esterase: x / RBC: x / WBC x   Sq Epi: x / Non Sq Epi: x / Bacteria: x

## 2024-09-19 NOTE — ED ADULT NURSE NOTE - NSFALLUNIVINTERV_ED_ALL_ED
Bed/Stretcher in lowest position, wheels locked, appropriate side rails in place/Call bell, personal items and telephone in reach/Instruct patient to call for assistance before getting out of bed/chair/stretcher/Non-slip footwear applied when patient is off stretcher/[place] to call system/Physically safe environment - no spills, clutter or unnecessary equipment/Purposeful proactive rounding/Room/bathroom lighting operational, light cord in reach

## 2024-09-19 NOTE — H&P ADULT - NSHPPHYSICALEXAM_GEN_ALL_CORE
General: No apparent distress  HEENT: Anicteric sclera. Moist mucous membranes.   Cardiac: Regular rate and rhythm. No murmurs, rubs, or gallops.   Vascular: Symmetric radial pulses. Dorsalis pedis pulses palpable.   Respiratory: Normal effort. Bilateral lower lobe faint crackles.   Abdomen: Soft, nontender. Audible bowel sounds.   Extremities: Warm with 2+ pitting edema around dorsum of foot and ankles. No cyanosis or clubbing.   Skin: Warm and dry. No rash.   Neurologic: Grossly normal motor function.   Psychiatric: Euthymic. Oriented to person, place, and time.

## 2024-09-19 NOTE — H&P ADULT - ASSESSMENT
Assessment:  70 yo F with a PMHx of Asthma, Rosacea and macular degeneration who presented to [facility] ED for progressively worsening SOB and orthopnea x 1 month. ProBNP 6863, Trop 14 > repeat pending. Bedside echo in ED with reduced EF. Patient is now admitted to cardiac telemetry for further management.     Problems discussed and associated plan:    #Shortness of breath  -admit to cardiac telemetry   -monitor on tele  -ECG:  bpm, TWI Inferolateral leads, wide QRS complex   -Trend troponin to peak (1st- 14)  - Keep NPO, consider ischemic workup if troponin rises   - Bedside echo in ED with reduced EF, will obtain official TTE to evaluate EF  - f/u AM Risk stratification Labs (A1C, Lipid panel, iron panel, thyroid panel)  - proBNP 6863  - CXR wnl  - Lasix 40mg IVP x 1  - obtain IVC in AM   - f/u AM ECG  - strict I&Os, monitor daily weights  - fluid and salt restricted diet  - monitor electrolytes, Keep K > 4, Mg > 2    #Asthma  -no home meds according to patient  -no wheezing on exam  -PRN Albuterol if needed    #Rosacea   -Patient uses Metro cream at home, okay to use inpatient, can fill out non-formulary medication form.     #Macular Degeneration  -stable  -continue home OTC drops    #Misc  -Heparin SQ for DVT prophylaxis   -Patient endorses desire to be DNR/DNI will readdress once on the unit     Please contact me with any questions or concerns at [phone]. Assessment:  70 yo F with a PMHx of Asthma, Rosacea and macular degeneration who presented to [facility] ED for progressively worsening SOB and orthopnea x 1 month. ProBNP 6863, Trop 14 > repeat pending. Bedside echo in ED with reduced EF. Patient is now admitted to cardiac telemetry for further management.     Problems discussed and associated plan:    #Shortness of breath  -admit to cardiac telemetry   -monitor on tele  -ECG:  bpm, TWI Inferolateral leads, wide QRS complex   -Trend troponin to peak (1st- 14 > 2nd 15) f/u AM trop   - Keep NPO, consider ischemic workup if troponin rises   - Bedside echo in ED with reduced EF, will obtain official TTE to evaluate EF  - f/u AM Risk stratification Labs (A1C, Lipid panel, iron panel, thyroid panel)  - proBNP 6863  - CXR wnl  - Lasix 40mg IVP x 1  - obtain IVC in AM   - f/u AM ECG  - strict I&Os, monitor daily weights  - fluid and salt restricted diet  - monitor electrolytes, Keep K > 4, Mg > 2    #Asthma  -no home meds according to patient  -no wheezing on exam  -PRN Albuterol if needed    #Rosacea   -Patient uses Metro cream at home, okay to use inpatient, can fill out non-formulary medication form.     #Macular Degeneration  -stable  -continue home OTC drops    #Misc  -Heparin SQ for DVT prophylaxis   -Patient endorses desire to be DNR/DNI will readdress once on the unit     Please contact me with any questions or concerns at [phone]. Assessment:  70 yo F with a PMHx of Asthma, Rosacea and macular degeneration who presented to [facility] ED for progressively worsening SOB and orthopnea x 1 month. ProBNP 6863, Trop 14 > repeat pending. Bedside echo in ED with reduced EF. Patient is now admitted to cardiac telemetry for further management.     Problems discussed and associated plan:    #Shortness of breath  -admit to cardiac telemetry   -monitor on tele  -ECG:  bpm, TWI Inferolateral leads, wide QRS complex   -Trend troponin to peak (1st- 14 > 2nd 15) f/u AM trop   - Keep NPO, consider ischemic workup if troponin rises   - Bedside echo in ED with reduced EF, will obtain official TTE to evaluate EF  - f/u AM Risk stratification Labs (A1C, Lipid panel, iron panel, thyroid panel)  - proBNP 6863  - CXR wnl  - Lasix 40mg IVP x 1, then Lasix 40mg IVP daily   - follow UOP  - f/u AM ECG  - strict I&Os, monitor daily weights  - fluid and salt restricted diet  - monitor electrolytes, Keep K > 4, Mg > 2    #Asthma  -no home meds according to patient  -no wheezing on exam  -PRN Albuterol if needed    #Rosacea   -Patient uses Metro cream at home, okay to use inpatient, can fill out non-formulary medication form.     #Macular Degeneration  -stable  -continue home OTC drops    #Misc  -Heparin SQ for DVT prophylaxis   -Patient endorses desire to be DNR/DNI will readdress once on the unit     Please contact me with any questions or concerns at [phone].

## 2024-09-19 NOTE — H&P ADULT - HISTORY OF PRESENT ILLNESS
Mrs. Marita Cheney is a 68 yo F with a PMHx of Asthma, Rosacea and macular degeneration who presented to [facility] ED for progressively worsening SOB and orthopnea x 1 month. Patient states her symptoms began in July 2024, when patient had SOB and was seen in urgent care setting where she was treated for Asthma exacerbation despite lack of wheezing on exam. She states her symptoms did not improve so she saw her PCP for similar symptoms 2 weeks later, at that time she was retreated with steroid course and zpak, and patient endorses at that time she felt a little better. However over the past month her symptoms have returned and are progressively worsening. Patient states in the past week she has noticed new LE swelling around her feet/  ankles, decreased activity tolerance and orthopnea. She states she feels winded when laying flat and sometimes cannot walk up her stairs without stopping 2/2 shortness of breath. She denies wheezing.  Patient otherwise denies chest pain, palpitations, syncope, dizziness, recent travel or sick contacts. She endorses her current symptoms feel different from her usual asthma attacks, which prompted her presentation to the ED. She otherwise denies cardiac history. Patient does not have a cardiologist.     In the ED patients /72  O2 97% on room air, RR 18  Labs notable for CBC WNL, K 3.9, ProBNP 6863, Trop 14 > repeat pending Cr 0.9  CXR:  No acute pulmonary process.  ECG:  bpm, TWI Inferolateral leads, wide QRS complex     Patient is now admitted to cardiac telemetry for further management.  Mrs. Marita Cheney is a 68 yo F with a PMHx of Asthma, Rosacea and macular degeneration who presented to [facility] ED for progressively worsening SOB and orthopnea x 1 month. Patient states her symptoms began in July 2024, when patient had SOB and was seen in urgent care setting where she was treated for Asthma exacerbation despite lack of wheezing on exam. She states her symptoms did not improve so she saw her PCP for similar symptoms 2 weeks later, at that time she was retreated with steroid course and zpak, and patient endorses at that time she felt a little better. However over the past month her symptoms have returned and are progressively worsening. Patient states in the past week she has noticed new LE swelling around her feet/  ankles, decreased activity tolerance and orthopnea. She states she feels winded when laying flat and sometimes cannot walk up her stairs without stopping 2/2 shortness of breath. She denies wheezing.  Patient otherwise denies chest pain, palpitations, syncope, dizziness, recent travel or sick contacts. She endorses her current symptoms feel different from her usual asthma attacks, which prompted her presentation to the ED. She otherwise denies cardiac history. Patient does not have a cardiologist.     In the ED patients /72  O2 97% on room air, RR 18  Labs notable for CBC WNL, K 3.9, ProBNP 6863, Trop 14 > repeat 15  Cr 0.9  CXR:  No acute pulmonary process.  ECG:  bpm, TWI Inferolateral leads, wide QRS complex     Patient is now admitted to cardiac telemetry for further management.

## 2024-09-19 NOTE — ED ADULT TRIAGE NOTE - CHIEF COMPLAINT QUOTE
Pt present for SOB and leg swelling x 3.  Hx asthma denies any chest pain Pt present for SOB and leg swelling x 3 days.  Hx asthma denies any chest pain

## 2024-09-19 NOTE — ED PROVIDER NOTE - OBJECTIVE STATEMENT
69-year-old female with a past medical history of asthma, macular degeneration, rosacea presents complaining of shortness of breath over the past several months. Patient notes worsening with exertion and yesterday noted edema to bilateral ankles. pt denies any other symptoms including fevers, chill, headache, recent illness/travel, cough, abdominal pain, chest pain.

## 2024-09-19 NOTE — H&P ADULT - NS ATTEND AMEND GEN_ALL_CORE FT
69yoF with asthma and macular degeneration who presented with 2 months of orthopnea and dyspnea. Concern for CHF based on patient's symptoms.     Plan:   - Lasix 40 mg IV x 1  - Strict I/O  - TTE tomorrow

## 2024-09-19 NOTE — PATIENT PROFILE ADULT - FALL HARM RISK - HARM RISK INTERVENTIONS

## 2024-09-19 NOTE — H&P ADULT - NSHPSOCIALHISTORY_GEN_ALL_CORE
Patient endorses she lives at home with her . She drinks wine occasionally otherwise denies any substance use.

## 2024-09-19 NOTE — ED PROVIDER NOTE - PHYSICAL EXAMINATION
Gen: NAD, AOx3  Head: NCAT  HEENT: PERRL, oral mucosa moist, normal conjunctiva, oropharynx clear without exudate or erythema  Lung: CTAB, no respiratory distress, no wheezing, rales, rhonchi  CV: normal s1/s2, rrr, Normal perfusion, pulses 2+ throughout  Abd: soft, NTND, no CVA tenderness  Genitourinary: no pelvic tenderness  MSK: BLE edema, no visible deformities, full range of motion in all 4 extremities  Neuro: CN II-XII grossly intact, No focal neurologic deficits  Skin: No rash   Psych: normal affect

## 2024-09-19 NOTE — ED PROVIDER NOTE - ATTENDING APP SHARED VISIT CONTRIBUTION OF CARE
I have reviewed and agree with the mid-level note, except as documented in my note below.    69-year-old female history of asthma, PSH significant for MARTHA, C4/S, non-smoker, denies daily alcohol use, now presents with shortness of breath for several weeks, worse over the past 3 days, worse with exertion and lying down, associated bilateral lower extremity edema, no relief with prednisone, Z-Hernesto and beta agonists, states is compliant with home medications, denies fever, hemoptysis, chest pain, LE pain recent immobilization or travel or other associated complaints at present. Old chart reviewed. I have reviewed and agree with the initial nursing note, except as documented in my note.    VSS, awake, alert, non-toxic appearing, oropharynx clear, no skin rash or lesions, no tracheal deviation, non-labored breathing without accessory muscle use apparent or pursed lip breathing, no retractions, speaks full sentences, chest CTAB, no w/r/r, +S1/S2, RRR, no m/r/g, abdomen soft, NT, ND, +BS, BL pedal edema, AO x 3, clear speech and steady gait.

## 2024-09-20 ENCOUNTER — TRANSCRIPTION ENCOUNTER (OUTPATIENT)
Age: 69
End: 2024-09-20

## 2024-09-20 ENCOUNTER — RESULT REVIEW (OUTPATIENT)
Age: 69
End: 2024-09-20

## 2024-09-20 LAB
A1C WITH ESTIMATED AVERAGE GLUCOSE RESULT: 6.1 % — HIGH (ref 4–5.6)
ANION GAP SERPL CALC-SCNC: 14 MMOL/L — SIGNIFICANT CHANGE UP (ref 7–14)
BUN SERPL-MCNC: 20 MG/DL — SIGNIFICANT CHANGE UP (ref 10–20)
CALCIUM SERPL-MCNC: 10 MG/DL — SIGNIFICANT CHANGE UP (ref 8.4–10.4)
CHLORIDE SERPL-SCNC: 107 MMOL/L — SIGNIFICANT CHANGE UP (ref 98–110)
CHOLEST SERPL-MCNC: 102 MG/DL — SIGNIFICANT CHANGE UP
CO2 SERPL-SCNC: 25 MMOL/L — SIGNIFICANT CHANGE UP (ref 17–32)
CREAT SERPL-MCNC: 1.1 MG/DL — SIGNIFICANT CHANGE UP (ref 0.7–1.5)
EGFR: 55 ML/MIN/1.73M2 — LOW
ESTIMATED AVERAGE GLUCOSE: 128 MG/DL — HIGH (ref 68–114)
FERRITIN SERPL-MCNC: 112 NG/ML — SIGNIFICANT CHANGE UP (ref 13–330)
GLUCOSE BLDC GLUCOMTR-MCNC: 106 MG/DL — HIGH (ref 70–99)
GLUCOSE BLDC GLUCOMTR-MCNC: 108 MG/DL — HIGH (ref 70–99)
GLUCOSE SERPL-MCNC: 110 MG/DL — HIGH (ref 70–99)
HCT VFR BLD CALC: 39.7 % — SIGNIFICANT CHANGE UP (ref 37–47)
HDLC SERPL-MCNC: 36 MG/DL — LOW
HGB BLD-MCNC: 13 G/DL — SIGNIFICANT CHANGE UP (ref 12–16)
IRON SATN MFR SERPL: 20 % — SIGNIFICANT CHANGE UP (ref 15–50)
IRON SATN MFR SERPL: 70 UG/DL — SIGNIFICANT CHANGE UP (ref 35–150)
LIPID PNL WITH DIRECT LDL SERPL: 53 MG/DL — SIGNIFICANT CHANGE UP
MAGNESIUM SERPL-MCNC: 2.3 MG/DL — SIGNIFICANT CHANGE UP (ref 1.8–2.4)
MCHC RBC-ENTMCNC: 32.7 G/DL — SIGNIFICANT CHANGE UP (ref 32–37)
MCHC RBC-ENTMCNC: 34.9 PG — HIGH (ref 27–31)
MCV RBC AUTO: 106.4 FL — HIGH (ref 81–99)
NON HDL CHOLESTEROL: 66 MG/DL — SIGNIFICANT CHANGE UP
NRBC # BLD: 0 /100 WBCS — SIGNIFICANT CHANGE UP (ref 0–0)
PLATELET # BLD AUTO: 211 K/UL — SIGNIFICANT CHANGE UP (ref 130–400)
PMV BLD: 10.5 FL — HIGH (ref 7.4–10.4)
POTASSIUM SERPL-MCNC: 4.4 MMOL/L — SIGNIFICANT CHANGE UP (ref 3.5–5)
POTASSIUM SERPL-SCNC: 4.4 MMOL/L — SIGNIFICANT CHANGE UP (ref 3.5–5)
RBC # BLD: 3.73 M/UL — LOW (ref 4.2–5.4)
RBC # FLD: 18.6 % — HIGH (ref 11.5–14.5)
SODIUM SERPL-SCNC: 146 MMOL/L — SIGNIFICANT CHANGE UP (ref 135–146)
T3FREE SERPL-MCNC: 2.92 PG/ML — SIGNIFICANT CHANGE UP (ref 2–4.4)
T4 FREE SERPL-MCNC: 1.7 NG/DL — SIGNIFICANT CHANGE UP (ref 0.9–1.8)
TIBC SERPL-MCNC: 349 UG/DL — SIGNIFICANT CHANGE UP (ref 220–430)
TRIGL SERPL-MCNC: 62 MG/DL — SIGNIFICANT CHANGE UP
TSH SERPL-MCNC: 2.39 UIU/ML — SIGNIFICANT CHANGE UP (ref 0.27–4.2)
UIBC SERPL-MCNC: 279 UG/DL — SIGNIFICANT CHANGE UP (ref 110–370)
WBC # BLD: 5.55 K/UL — SIGNIFICANT CHANGE UP (ref 4.8–10.8)
WBC # FLD AUTO: 5.55 K/UL — SIGNIFICANT CHANGE UP (ref 4.8–10.8)

## 2024-09-20 PROCEDURE — 99233 SBSQ HOSP IP/OBS HIGH 50: CPT

## 2024-09-20 PROCEDURE — 93010 ELECTROCARDIOGRAM REPORT: CPT

## 2024-09-20 PROCEDURE — 93306 TTE W/DOPPLER COMPLETE: CPT | Mod: 26

## 2024-09-20 RX ORDER — SACUBITRIL AND VALSARTAN 97; 103 MG/1; MG/1
1 TABLET, FILM COATED ORAL
Refills: 0 | Status: DISCONTINUED | OUTPATIENT
Start: 2024-09-20 | End: 2024-09-25

## 2024-09-20 RX ADMIN — Medication 5000 UNIT(S): at 06:01

## 2024-09-20 RX ADMIN — SACUBITRIL AND VALSARTAN 1 TABLET(S): 97; 103 TABLET, FILM COATED ORAL at 17:23

## 2024-09-20 RX ADMIN — FUROSEMIDE 40 MILLIGRAM(S): 10 INJECTION INTRAVENOUS at 06:01

## 2024-09-20 RX ADMIN — Medication 5000 UNIT(S): at 17:23

## 2024-09-20 NOTE — DISCHARGE NOTE NURSING/CASE MANAGEMENT/SOCIAL WORK - NSDCVIVACCINE_GEN_ALL_CORE_FT
Tdap; 07-Dec-2020 12:18; Xiomy Mike (JACQUIE); [employer]; [id number] (Exp. Date: 31-Jul-2022); IntraMuscular; Deltoid Left.; 0.5 milliLiter(s); VIS (VIS Published: 09-May-2013, VIS Presented: 07-Dec-2020);

## 2024-09-20 NOTE — PROGRESS NOTE ADULT - SUBJECTIVE AND OBJECTIVE BOX
Chief complaint: Patient is a 69y old  Female who presents with a chief complaint of Shortness of breath (19 Sep 2024 13:43)    HPI: Mrs. Marita Cheney is a 70 yo F with a PMHx of Asthma, Rosacea and macular degeneration who presented to [facility] ED for progressively worsening SOB and orthopnea x 1 month. Patient states her symptoms began in July 2024, when patient had SOB and was seen in urgent care setting where she was treated for Asthma exacerbation despite lack of wheezing on exam. She states her symptoms did not improve so she saw her PCP for similar symptoms 2 weeks later, at that time she was retreated with steroid course and zpak, and patient endorses at that time she felt a little better. However over the past month her symptoms have returned and are progressively worsening. Patient states in the past week she has noticed new LE swelling around her feet/  ankles, decreased activity tolerance and orthopnea. She states she feels winded when laying flat and sometimes cannot walk up her stairs without stopping 2/2 shortness of breath. She denies wheezing.  Patient otherwise denies chest pain, palpitations, syncope, dizziness, recent travel or sick contacts. She endorses her current symptoms feel different from her usual asthma attacks, which prompted her presentation to the ED. She otherwise denies cardiac history. Patient does not have a cardiologist.     In the ED patients /72  O2 97% on room air, RR 18  Labs notable for CBC WNL, K 3.9, ProBNP 6863, Trop 14 > repeat 15  Cr 0.9  CXR:  No acute pulmonary process.  ECG:  bpm, TWI Inferolateral leads, wide QRS complex     Patient is now admitted to cardiac telemetry for further management.     Interval history: Patient seen and examined at bedside this AM. NAD. Good UOP on Lasix. Denies CP, SOB, palpitations.     Review of systems: A complete 10-point review of systems was obtained and is negative except as stated in the interval history.    Vitals:  ICU Vital Signs Last 24 Hrs  T(C): 36.7 (19 Sep 2024 23:48), Max: 36.7 (19 Sep 2024 23:48)  T(F): 98.1 (19 Sep 2024 23:48), Max: 98.1 (19 Sep 2024 23:48)  HR: 97 (20 Sep 2024 04:40) (91 - 105)  BP: 128/78 (20 Sep 2024 04:40) (118/72 - 128/78)  BP(mean): 98 (20 Sep 2024 04:40) (96 - 98)  RR: 18 (20 Sep 2024 04:40) (18 - 19)  SpO2: 93% (20 Sep 2024 04:40) (92% - 97%)    O2 Parameters below as of 20 Sep 2024 04:40  Patient On (Oxygen Delivery Method): room air      Ins & outs:     09-19 @ 07:01  -  09-20 @ 07:00  --------------------------------------------------------  IN: 0 mL / OUT: 2360 mL / NET: -2360 mL    19 Sep 2024 07:01  -  20 Sep 2024 07:00  --------------------------------------------------------  IN: 0 mL / OUT: 2360 mL / NET: -2360 mL        Weight trend:  Weight (kg): 89.9 (09-19)    Physical exam:  General: No apparent distress  HEENT: Anicteric sclera. Moist mucous membranes. .   Cardiac: Regular rate and rhythm. No murmurs, rubs, or gallops.   Vascular: Symmetric radial pulses. Dorsalis pedis pulses palpable.   Respiratory: Normal effort. Bibasilar crackles  Abdomen: Soft, nontender. Audible bowel sounds.   Extremities: Warm with 2+ pedal/ ankle edema. No cyanosis or clubbing.   Skin: Warm and dry. No rash.   Neurologic: Grossly normal motor function.   Psychiatric: Oriented to person, place, and time.     Data reviewed:  - Telemetry:   - ECG 9/19/24: bpm, TWI Inferolateral leads, wide QRS complex   - Echo: pending  - CXR: 9/19/24 CXR No acute pulmonary process.     - Labs:                        13.0   5.55  )-----------( 211      ( 20 Sep 2024 05:52 )             39.7     09-20    146  |  107  |  20  ----------------------------<  110[H]  4.4   |  25  |  1.1    Ca    10.0      20 Sep 2024 05:52  Mg     2.3     09-20    TPro  7.0  /  Alb  4.2  /  TBili  1.4[H]  /  DBili  x   /  AST  30  /  ALT  37  /  AlkPhos  105  09-19    Triglycerides, Serum: 62 mg/dL (09-20-24 @ 05:52)  LDL Cholesterol Calculated: 53 mg/dL (09-20-24 @ 05:52)      Urinalysis Basic - ( 20 Sep 2024 05:52 )    Color: x / Appearance: x / SG: x / pH: x  Gluc: 110 mg/dL / Ketone: x  / Bili: x / Urobili: x   Blood: x / Protein: x / Nitrite: x   Leuk Esterase: x / RBC: x / WBC x   Sq Epi: x / Non Sq Epi: x / Bacteria: x        Medications:  furosemide   Injectable 40 milliGRAM(s) IV Push daily  heparin   Injectable 5000 Unit(s) SubCutaneous every 12 hours    Drips:    PRN:     Allergies    No Known Allergies    Intolerances    . Chief complaint: Patient is a 69y old  Female who presents with a chief complaint of Shortness of breath (19 Sep 2024 13:43)    HPI: Mrs. Marita Cheney is a 68 yo F with a PMHx of Asthma, Rosacea and macular degeneration who presented to [facility] ED for progressively worsening SOB and orthopnea x 1 month. Patient states her symptoms began in July 2024, when patient had SOB and was seen in urgent care setting where she was treated for Asthma exacerbation despite lack of wheezing on exam. She states her symptoms did not improve so she saw her PCP for similar symptoms 2 weeks later, at that time she was retreated with steroid course and zpak, and patient endorses at that time she felt a little better. However over the past month her symptoms have returned and are progressively worsening. Patient states in the past week she has noticed new LE swelling around her feet/  ankles, decreased activity tolerance and orthopnea. She states she feels winded when laying flat and sometimes cannot walk up her stairs without stopping 2/2 shortness of breath. She denies wheezing.  Patient otherwise denies chest pain, palpitations, syncope, dizziness, recent travel or sick contacts. She endorses her current symptoms feel different from her usual asthma attacks, which prompted her presentation to the ED. She otherwise denies cardiac history. Patient does not have a cardiologist.     In the ED patients /72  O2 97% on room air, RR 18  Labs notable for CBC WNL, K 3.9, ProBNP 6863, Trop 14 > repeat 15  Cr 0.9  CXR:  No acute pulmonary process.  ECG:  bpm, TWI Inferolateral leads, wide QRS complex     Patient is now admitted to cardiac telemetry for further management.     Interval history: Patient seen and examined at bedside this AM. NAD. Good UOP on Lasix. Denies CP, SOB, palpitations.     Review of systems: A complete 10-point review of systems was obtained and is negative except as stated in the interval history.    Vitals:  ICU Vital Signs Last 24 Hrs  T(C): 36.7 (19 Sep 2024 23:48), Max: 36.7 (19 Sep 2024 23:48)  T(F): 98.1 (19 Sep 2024 23:48), Max: 98.1 (19 Sep 2024 23:48)  HR: 97 (20 Sep 2024 04:40) (91 - 105)  BP: 128/78 (20 Sep 2024 04:40) (118/72 - 128/78)  BP(mean): 98 (20 Sep 2024 04:40) (96 - 98)  RR: 18 (20 Sep 2024 04:40) (18 - 19)  SpO2: 93% (20 Sep 2024 04:40) (92% - 97%)    O2 Parameters below as of 20 Sep 2024 04:40  Patient On (Oxygen Delivery Method): room air      Ins & outs:     09-19 @ 07:01  -  09-20 @ 07:00  --------------------------------------------------------  IN: 0 mL / OUT: 2360 mL / NET: -2360 mL    19 Sep 2024 07:01  -  20 Sep 2024 07:00  --------------------------------------------------------  IN: 0 mL / OUT: 2360 mL / NET: -2360 mL        Weight trend:  Weight (kg): 89.9 (09-19)    Physical exam:  General: No apparent distress  HEENT: Anicteric sclera. Moist mucous membranes. .   Cardiac: Regular rate and rhythm. No murmurs, rubs, or gallops.   Vascular: Symmetric radial pulses. Dorsalis pedis pulses palpable.   Respiratory: Normal effort. Bibasilar crackles  Abdomen: Soft, nontender. Audible bowel sounds.   Extremities: Warm with 2+ pedal/ ankle edema. No cyanosis or clubbing.   Skin: Warm and dry. No rash.   Neurologic: Grossly normal motor function.   Psychiatric: Oriented to person, place, and time.     Data reviewed:  - Telemetry: SR/ST    - ECG 9/19/24: bpm, TWI Inferolateral leads, wide QRS complex   - Echo: pending  - CXR: 9/19/24 CXR No acute pulmonary process.     - Labs:                        13.0   5.55  )-----------( 211      ( 20 Sep 2024 05:52 )             39.7     09-20    146  |  107  |  20  ----------------------------<  110[H]  4.4   |  25  |  1.1    Ca    10.0      20 Sep 2024 05:52  Mg     2.3     09-20    TPro  7.0  /  Alb  4.2  /  TBili  1.4[H]  /  DBili  x   /  AST  30  /  ALT  37  /  AlkPhos  105  09-19    Triglycerides, Serum: 62 mg/dL (09-20-24 @ 05:52)  LDL Cholesterol Calculated: 53 mg/dL (09-20-24 @ 05:52)      Urinalysis Basic - ( 20 Sep 2024 05:52 )    Color: x / Appearance: x / SG: x / pH: x  Gluc: 110 mg/dL / Ketone: x  / Bili: x / Urobili: x   Blood: x / Protein: x / Nitrite: x   Leuk Esterase: x / RBC: x / WBC x   Sq Epi: x / Non Sq Epi: x / Bacteria: x        Medications:  furosemide   Injectable 40 milliGRAM(s) IV Push daily  heparin   Injectable 5000 Unit(s) SubCutaneous every 12 hours    Drips:    PRN:     Allergies    No Known Allergies    Intolerances    .

## 2024-09-20 NOTE — PROGRESS NOTE ADULT - ASSESSMENT
Assessment:  70 yo F with a PMHx of Asthma, Rosacea and macular degeneration who presented to [facility] ED for progressively worsening SOB and orthopnea x 1 month. ProBNP 6863, Trop 14 > repeat pending. Bedside echo in ED with reduced EF. Patient is now admitted to cardiac telemetry for further management.     Problems discussed and associated plan:    #Shortness of breath  -admit to cardiac telemetry   -monitor on tele  -ECG:  bpm, TWI Inferolateral leads, wide QRS complex   -Trend troponin to peak (1st- 14 > 2nd 15)   - Bedside echo in ED with reduced EF  - f/u official TTE to evaluate EF  - Lipid panel WNL- LDL 53  - f/u A1C  - proBNP 6863  - CXR wnl  - c/w Lasix 40mg IVP daily - pt making good UOP  - strict I&Os, monitor daily weights  - fluid and salt restricted diet  - monitor electrolytes, Keep K > 4, Mg > 2    #Asthma  -no home meds according to patient  -no wheezing on exam  -PRN Albuterol if needed    #Rosacea   -Patient uses Metro cream at home, okay to use inpatient, can fill out non-formulary medication form.     #Macular Degeneration  -stable  -continue home OTC drops    #Misc  -Heparin SQ for DVT prophylaxis   -Patient endorses desire to be DNR/DNI will readdress once on the unit     Please contact me with any questions or concerns at [phone].   Assessment:  68 yo F with a PMHx of Asthma, Rosacea and macular degeneration who presented to [facility] ED for progressively worsening SOB and orthopnea x 1 month. ProBNP 6863, Trop 14 > repeat pending. Bedside echo in ED with reduced EF. Patient is now admitted to cardiac telemetry for further management.     Problems discussed and associated plan:    #Shortness of breath  -admit to cardiac telemetry   -monitor on tele  -ECG:  bpm, TWI Inferolateral leads, wide QRS complex   -Trend troponin to peak (1st- 14 > 2nd 15)   - Bedside echo in ED with reduced EF  - f/u official TTE to evaluate EF  - Lipid panel WNL- LDL 53  - f/u A1C  - proBNP 6863  - CXR wnl  - c/w Lasix 40mg IVP daily - pt making good UOP  -NPO will assess need for ischemic eval if echo with low ef   - strict I&Os, monitor daily weights  - fluid and salt restricted diet  - monitor electrolytes, Keep K > 4, Mg > 2  - If EF normal, consider BB for sinus tachycardia     #Asthma  -no home meds according to patient  -no wheezing on exam  -PRN Albuterol if needed    #Rosacea   -Patient uses Metro cream at home, okay to use inpatient, can fill out non-formulary medication form.     #Macular Degeneration  -stable  -continue home OTC drops    #Misc  -Heparin SQ for DVT prophylaxis   -Patient endorses desire to be DNR/DNI will readdress once on the unit     Please contact me with any questions or concerns at [phone].

## 2024-09-20 NOTE — DISCHARGE NOTE NURSING/CASE MANAGEMENT/SOCIAL WORK - PATIENT PORTAL LINK FT
You can access the FollowMyHealth Patient Portal offered by [facility] by registering at the following website: [email or web address]. By joining [facility]’s FollowMyHealth portal, you will also be able to view your health information using other applications (apps) compatible with our system.

## 2024-09-21 LAB
ANION GAP SERPL CALC-SCNC: 13 MMOL/L — SIGNIFICANT CHANGE UP (ref 7–14)
BUN SERPL-MCNC: 18 MG/DL — SIGNIFICANT CHANGE UP (ref 10–20)
CALCIUM SERPL-MCNC: 10 MG/DL — SIGNIFICANT CHANGE UP (ref 8.4–10.5)
CHLORIDE SERPL-SCNC: 107 MMOL/L — SIGNIFICANT CHANGE UP (ref 98–110)
CO2 SERPL-SCNC: 24 MMOL/L — SIGNIFICANT CHANGE UP (ref 17–32)
CREAT SERPL-MCNC: 0.9 MG/DL — SIGNIFICANT CHANGE UP (ref 0.7–1.5)
EGFR: 70 ML/MIN/1.73M2 — SIGNIFICANT CHANGE UP
GLUCOSE SERPL-MCNC: 109 MG/DL — HIGH (ref 70–99)
HCT VFR BLD CALC: 44.8 % — SIGNIFICANT CHANGE UP (ref 37–47)
HGB BLD-MCNC: 14.7 G/DL — SIGNIFICANT CHANGE UP (ref 12–16)
MAGNESIUM SERPL-MCNC: 2.1 MG/DL — SIGNIFICANT CHANGE UP (ref 1.8–2.4)
MCHC RBC-ENTMCNC: 32.8 G/DL — SIGNIFICANT CHANGE UP (ref 32–37)
MCHC RBC-ENTMCNC: 34.8 PG — HIGH (ref 27–31)
MCV RBC AUTO: 106.2 FL — HIGH (ref 81–99)
NRBC # BLD: 0 /100 WBCS — SIGNIFICANT CHANGE UP (ref 0–0)
PLATELET # BLD AUTO: 231 K/UL — SIGNIFICANT CHANGE UP (ref 130–400)
PMV BLD: 10.2 FL — SIGNIFICANT CHANGE UP (ref 7.4–10.4)
POTASSIUM SERPL-MCNC: 4.1 MMOL/L — SIGNIFICANT CHANGE UP (ref 3.5–5)
POTASSIUM SERPL-SCNC: 4.1 MMOL/L — SIGNIFICANT CHANGE UP (ref 3.5–5)
RBC # BLD: 4.22 M/UL — SIGNIFICANT CHANGE UP (ref 4.2–5.4)
RBC # FLD: 18.4 % — HIGH (ref 11.5–14.5)
SODIUM SERPL-SCNC: 144 MMOL/L — SIGNIFICANT CHANGE UP (ref 135–146)
WBC # BLD: 5.45 K/UL — SIGNIFICANT CHANGE UP (ref 4.8–10.8)
WBC # FLD AUTO: 5.45 K/UL — SIGNIFICANT CHANGE UP (ref 4.8–10.8)

## 2024-09-21 PROCEDURE — 93010 ELECTROCARDIOGRAM REPORT: CPT

## 2024-09-21 PROCEDURE — 99233 SBSQ HOSP IP/OBS HIGH 50: CPT

## 2024-09-21 RX ORDER — METOPROLOL TARTRATE 50 MG
25 TABLET ORAL DAILY
Refills: 0 | Status: DISCONTINUED | OUTPATIENT
Start: 2024-09-21 | End: 2024-09-25

## 2024-09-21 RX ADMIN — FUROSEMIDE 40 MILLIGRAM(S): 10 INJECTION INTRAVENOUS at 05:51

## 2024-09-21 RX ADMIN — Medication 5000 UNIT(S): at 17:16

## 2024-09-21 RX ADMIN — Medication 5000 UNIT(S): at 05:51

## 2024-09-21 RX ADMIN — SACUBITRIL AND VALSARTAN 1 TABLET(S): 97; 103 TABLET, FILM COATED ORAL at 17:16

## 2024-09-21 RX ADMIN — SACUBITRIL AND VALSARTAN 1 TABLET(S): 97; 103 TABLET, FILM COATED ORAL at 05:51

## 2024-09-21 RX ADMIN — Medication 25 MILLIGRAM(S): at 11:30

## 2024-09-21 NOTE — PROGRESS NOTE ADULT - SUBJECTIVE AND OBJECTIVE BOX
Chief complaint: Patient is a 69y old  Female who presents with a chief complaint of Shortness of breath (19 Sep 2024 13:43)    HPI: Mrs. Marita Cheney is a 68 yo F with a PMHx of Asthma, Rosacea and macular degeneration who presented to [facility] ED for progressively worsening SOB and orthopnea x 1 month. Patient states her symptoms began in July 2024, when patient had SOB and was seen in urgent care setting where she was treated for Asthma exacerbation despite lack of wheezing on exam. She states her symptoms did not improve so she saw her PCP for similar symptoms 2 weeks later, at that time she was retreated with steroid course and zpak, and patient endorses at that time she felt a little better. However over the past month her symptoms have returned and are progressively worsening. Patient states in the past week she has noticed new LE swelling around her feet/  ankles, decreased activity tolerance and orthopnea. She states she feels winded when laying flat and sometimes cannot walk up her stairs without stopping 2/2 shortness of breath. She denies wheezing.  Patient otherwise denies chest pain, palpitations, syncope, dizziness, recent travel or sick contacts. She endorses her current symptoms feel different from her usual asthma attacks, which prompted her presentation to the ED. She otherwise denies cardiac history. Patient does not have a cardiologist.     In the ED patients /72  O2 97% on room air, RR 18  Labs notable for CBC WNL, K 3.9, ProBNP 6863, Trop 14 > repeat 15  Cr 0.9  CXR:  No acute pulmonary process.  ECG:  bpm, TWI Inferolateral leads, wide QRS complex     Patient is now admitted to cardiac telemetry for further management.     Interval history: Patient seen and examined at bedside this AM. NAD. Good UOP on Lasix. Denies CP, SOB, palpitations. Echo with reduced EF- patient to undergo ischemic wkup on monday.     Review of systems: A complete 10-point review of systems was obtained and is negative except as stated in the interval history.    Vitals:  ICU Vital Signs Last 24 Hrs  T(C): 36.6 (21 Sep 2024 04:38), Max: 36.9 (20 Sep 2024 15:22)  T(F): 97.8 (21 Sep 2024 04:38), Max: 98.5 (20 Sep 2024 15:22)  HR: 90 (21 Sep 2024 04:38) (88 - 97)  BP: 108/70 (21 Sep 2024 04:38) (108/70 - 128/75)  BP(mean): 84 (21 Sep 2024 04:38) (83 - 97)  RR: 19 (21 Sep 2024 04:38) (18 - 20)  SpO2: 97% (20 Sep 2024 17:22) (92% - 97%)    O2 Parameters below as of 20 Sep 2024 17:22  Patient On (Oxygen Delivery Method): room air    Ins & outs:     09-19 @ 07:01  -  09-20 @ 07:00  --------------------------------------------------------  IN: 0 mL / OUT: 2360 mL / NET: -2360 mL    19 Sep 2024 07:01  -  20 Sep 2024 07:00  --------------------------------------------------------  IN: 0 mL / OUT: 2360 mL / NET: -2360 mL    Weight trend:  Weight (kg): 89.9 (09-19)    Physical exam:  General: No apparent distress  HEENT: Anicteric sclera. Moist mucous membranes. .   Cardiac: Regular rate and rhythm. No murmurs, rubs, or gallops.   Vascular: Symmetric radial pulses. Dorsalis pedis pulses palpable.   Respiratory: Normal effort. Bibasilar crackles  Abdomen: Soft, nontender. Audible bowel sounds.   Extremities: Warm with 2+ pedal/ ankle edema. No cyanosis or clubbing.   Skin: Warm and dry. No rash.   Neurologic: Grossly normal motor function.   Psychiatric: Oriented to person, place, and time.     Data reviewed:  - Telemetry: SR/ST    - ECG 9/19/24: bpm, TWI Inferolateral leads, wide QRS complex   - Echo: pending  - CXR: 9/19/24 CXR No acute pulmonary process.     - Labs:                        13.0   5.55  )-----------( 211      ( 20 Sep 2024 05:52 )             39.7     09-20    146  |  107  |  20  ----------------------------<  110[H]  4.4   |  25  |  1.1    Ca    10.0      20 Sep 2024 05:52  Mg     2.3     09-20    TPro  7.0  /  Alb  4.2  /  TBili  1.4[H]  /  DBili  x   /  AST  30  /  ALT  37  /  AlkPhos  105  09-19    Triglycerides, Serum: 62 mg/dL (09-20-24 @ 05:52)  LDL Cholesterol Calculated: 53 mg/dL (09-20-24 @ 05:52)      Urinalysis Basic - ( 20 Sep 2024 05:52 )    Color: x / Appearance: x / SG: x / pH: x  Gluc: 110 mg/dL / Ketone: x  / Bili: x / Urobili: x   Blood: x / Protein: x / Nitrite: x   Leuk Esterase: x / RBC: x / WBC x   Sq Epi: x / Non Sq Epi: x / Bacteria: x    Medications:  furosemide   Injectable 40 milliGRAM(s) IV Push daily  heparin   Injectable 5000 Unit(s) SubCutaneous every 12 hours    Drips:    PRN:     Allergies    No Known Allergies    Intolerances    . Chief complaint: Patient is a 69y old  Female who presents with a chief complaint of Shortness of breath (19 Sep 2024 13:43)    HPI: Mrs. Marita Cheney is a 68 yo F with a PMHx of Asthma, Rosacea and macular degeneration who presented to [facility] ED for progressively worsening SOB and orthopnea x 1 month. Patient states her symptoms began in July 2024, when patient had SOB and was seen in urgent care setting where she was treated for Asthma exacerbation despite lack of wheezing on exam. She states her symptoms did not improve so she saw her PCP for similar symptoms 2 weeks later, at that time she was retreated with steroid course and zpak, and patient endorses at that time she felt a little better. However over the past month her symptoms have returned and are progressively worsening. Patient states in the past week she has noticed new LE swelling around her feet/  ankles, decreased activity tolerance and orthopnea. She states she feels winded when laying flat and sometimes cannot walk up her stairs without stopping 2/2 shortness of breath. She denies wheezing.  Patient otherwise denies chest pain, palpitations, syncope, dizziness, recent travel or sick contacts. She endorses her current symptoms feel different from her usual asthma attacks, which prompted her presentation to the ED. She otherwise denies cardiac history. Patient does not have a cardiologist.     In the ED patients /72  O2 97% on room air, RR 18  Labs notable for CBC WNL, K 3.9, ProBNP 6863, Trop 14 > repeat 15  Cr 0.9  CXR:  No acute pulmonary process.  ECG:  bpm, TWI Inferolateral leads, wide QRS complex     Patient is now admitted to cardiac telemetry for further management.     Interval history: Patient seen and examined at bedside this AM. NAD. Good UOP on Lasix. Denies CP, SOB, palpitations. Echo with reduced EF- patient to undergo ischemic wkup on monday.     Review of systems: A complete 10-point review of systems was obtained and is negative except as stated in the interval history.    Vitals:  ICU Vital Signs Last 24 Hrs  T(C): 36.6 (21 Sep 2024 04:38), Max: 36.9 (20 Sep 2024 15:22)  T(F): 97.8 (21 Sep 2024 04:38), Max: 98.5 (20 Sep 2024 15:22)  HR: 90 (21 Sep 2024 04:38) (88 - 97)  BP: 108/70 (21 Sep 2024 04:38) (108/70 - 128/75)  BP(mean): 84 (21 Sep 2024 04:38) (83 - 97)  RR: 19 (21 Sep 2024 04:38) (18 - 20)  SpO2: 97% (20 Sep 2024 17:22) (92% - 97%)    O2 Parameters below as of 20 Sep 2024 17:22  Patient On (Oxygen Delivery Method): room air    Ins & outs:     09-19 @ 07:01  -  09-20 @ 07:00  --------------------------------------------------------  IN: 0 mL / OUT: 2360 mL / NET: -2360 mL      20 Sep 2024 07:01  -  21 Sep 2024 07:00  --------------------------------------------------------  IN: 440 mL / OUT: 1700 mL / NET: -1260 mL    21 Sep 2024 07:01  -  21 Sep 2024 09:28  --------------------------------------------------------  IN: 0 mL / OUT: 800 mL / NET: -800 mL    Weight trend:  Weight (kg): 89.9 (09-19)    Physical exam:  General: No apparent distress  HEENT: Anicteric sclera. Moist mucous membranes. .   Cardiac: Regular rate and rhythm. No murmurs, rubs, or gallops.   Vascular: Symmetric radial pulses. Dorsalis pedis pulses palpable.   Respiratory: Normal effort. Clear to auscultation bilaterally.   Abdomen: Soft, nontender. Audible bowel sounds.   Extremities: Warm with 1+ pedal/ ankle edema R>L. No cyanosis or clubbing.   Skin: Warm and dry. No rash.   Neurologic: Grossly normal motor function.   Psychiatric: Oriented to person, place, and time.     Data reviewed:  - Telemetry: SR/ST    - ECG 9/19/24: bpm, TWI Inferolateral leads, wide QRS complex   - Echo: pending  - CXR: 9/19/24 CXR No acute pulmonary process.       - Labs:                        14.7   5.45  )-----------( 231      ( 21 Sep 2024 06:24 )             44.8     09-21    144  |  107  |  18  ----------------------------<  109[H]  4.1   |  24  |  0.9    Ca    10.0      21 Sep 2024 06:24  Mg     2.1     09-21    TPro  7.0  /  Alb  4.2  /  TBili  1.4[H]  /  DBili  x   /  AST  30  /  ALT  37  /  AlkPhos  105  09-19    LIVER FUNCTIONS - ( 19 Sep 2024 12:11 )  Alb: 4.2 g/dL / Pro: 7.0 g/dL / ALK PHOS: 105 U/L / ALT: 37 U/L / AST: 30 U/L / GGT: x           Urinalysis Basic - ( 21 Sep 2024 06:24 )    Color: x / Appearance: x / SG: x / pH: x  Gluc: 109 mg/dL / Ketone: x  / Bili: x / Urobili: x   Blood: x / Protein: x / Nitrite: x   Leuk Esterase: x / RBC: x / WBC x   Sq Epi: x / Non Sq Epi: x / Bacteria: x      Medications:  MEDICATIONS  (STANDING):  furosemide   Injectable 40 milliGRAM(s) IV Push daily  heparin   Injectable 5000 Unit(s) SubCutaneous every 12 hours  sacubitril 24 mG/valsartan 26 mG 1 Tablet(s) Oral two times a day    MEDICATIONS  (PRN):

## 2024-09-21 NOTE — PROGRESS NOTE ADULT - ASSESSMENT
Assessment:  68 yo F with a PMHx of Asthma, Rosacea and macular degeneration who presented to [facility] ED for progressively worsening SOB and orthopnea x 1 month. ProBNP 6863, Trop 14 > repeat pending. Bedside echo in ED with reduced EF. Patient is now admitted to cardiac telemetry for further management.     Problems discussed and associated plan:    #Shortness of breath  -admit to cardiac telemetry   -monitor on tele  -ECG:  bpm, TWI Inferolateral leads, wide QRS complex   -Trend troponin to peak (1st- 14 > 2nd 15)   - TTE- EF 21% Severe MR, severe LAE Multiple left ventricular regional wall motion abnormalities  - Given newly reduced EF and severe MR, will diurese through the weekend for ischemic eval on Monday   - Lipid panel WNL- LDL 53  - A1C 6.1%  - proBNP 6863  - CXR wnl  - c/w Lasix 40mg IVP daily - pt making good UOP  - strict I&Os, monitor daily weights  - fluid and salt restricted diet  - monitor electrolytes, Keep K > 4, Mg > 2  -start Toprol XL 25mg     #Asthma  -no home meds according to patient  -no wheezing on exam  -PRN Albuterol if needed    #Rosacea   -Patient uses Metro cream at home, okay to use inpatient    #Macular Degeneration  -stable  -continue home OTC drops    #Misc  -Heparin SQ for DVT prophylaxis   -Patient endorses desire to be DNR/DNI will readdress once on the unit     Please contact me with any questions or concerns at [phone].   Assessment:  70 yo F with a PMHx of Asthma, Rosacea and macular degeneration who presented to [facility] ED for progressively worsening SOB and orthopnea x 1 month. ProBNP 6863, Trop 14 > repeat pending. Bedside echo in ED with reduced EF. Patient is now admitted to cardiac telemetry for further management.     Problems discussed and associated plan:    #Shortness of breath  -admit to cardiac telemetry   -monitor on tele  -ECG:  bpm, TWI Inferolateral leads, wide QRS complex   -Trend troponin to peak (1st- 14 > 2nd 15)   - TTE- EF 21% Severe MR, severe LAE Multiple left ventricular regional wall motion abnormalities  - Given newly reduced EF and severe MR, will diurese through the weekend for ischemic eval on Monday   - Lipid panel WNL- LDL 53  - A1C 6.1%  - proBNP 6863  - CXR wnl  - c/w Lasix 40mg IVP daily - pt making good UOP  - strict I&Os, monitor daily weights  - fluid and salt restricted diet  - monitor electrolytes, Keep K > 4, Mg > 2  - start Toprol XL 25mg   - started Entresto 24/26mg on 9/20    #Asthma  -no home meds according to patient  -no wheezing on exam  -PRN Albuterol if needed    #Rosacea   -Patient uses Metro cream at home, okay to use inpatient    #Macular Degeneration  -stable  -continue home OTC drops    #Misc  -Heparin SQ for DVT prophylaxis   -Patient endorses desire to be DNR/DNI     Please contact me with any questions or concerns at [phone].   Assessment:  68 yo F with a PMHx of Asthma, Rosacea and macular degeneration who presented to [facility] ED for progressively worsening SOB and orthopnea x 1 month. ProBNP 6863, Trop 14 > repeat pending. Bedside echo in ED with reduced EF. Patient is now admitted to cardiac telemetry for further management.     Problems discussed and associated plan:    #Shortness of breath  -continue to monitor on tele  -ECG:  bpm, TWI Inferolateral leads, wide QRS complex   -Trend troponin to peak (1st- 14 > 2nd 15)   - TTE- EF 21% Severe MR, severe LAE Multiple left ventricular regional wall motion abnormalities  - Given newly reduced EF and severe MR, will diurese through the weekend for ischemic eval on Monday   - c/w Lasix 40mg IVP daily - pt making good UOP  - Lipid panel WNL- LDL 53  - A1C 6.1%  - proBNP 6863  - CXR wnl  - strict I&Os, monitor daily weights  - fluid and salt restricted diet  - monitor electrolytes, Keep K > 4, Mg > 2  - start Toprol XL 25mg   - started Entresto 24/26mg on 9/20  - start spironolactone 9/22 if BP permits  - Farxiga on discharge     #Asthma  -no home meds according to patient  -no wheezing on exam  -PRN Albuterol if needed    #Rosacea   -Patient uses Metro cream at home, okay to use inpatient    #Macular Degeneration  -stable  -continue home OTC drops    #Misc  -Heparin SQ for DVT prophylaxis- hold sunday night for LHC   -Patient endorses desire to be DNR/DNI     Please contact me with any questions or concerns at [phone].

## 2024-09-22 LAB
ANION GAP SERPL CALC-SCNC: 12 MMOL/L — SIGNIFICANT CHANGE UP (ref 7–14)
BUN SERPL-MCNC: 17 MG/DL — SIGNIFICANT CHANGE UP (ref 10–20)
CALCIUM SERPL-MCNC: 9.9 MG/DL — SIGNIFICANT CHANGE UP (ref 8.4–10.5)
CHLORIDE SERPL-SCNC: 105 MMOL/L — SIGNIFICANT CHANGE UP (ref 98–110)
CO2 SERPL-SCNC: 26 MMOL/L — SIGNIFICANT CHANGE UP (ref 17–32)
CREAT SERPL-MCNC: 0.8 MG/DL — SIGNIFICANT CHANGE UP (ref 0.7–1.5)
EGFR: 80 ML/MIN/1.73M2 — SIGNIFICANT CHANGE UP
GLUCOSE SERPL-MCNC: 127 MG/DL — HIGH (ref 70–99)
HCT VFR BLD CALC: 46.1 % — SIGNIFICANT CHANGE UP (ref 37–47)
HGB BLD-MCNC: 15.3 G/DL — SIGNIFICANT CHANGE UP (ref 12–16)
MAGNESIUM SERPL-MCNC: 2 MG/DL — SIGNIFICANT CHANGE UP (ref 1.8–2.4)
MCHC RBC-ENTMCNC: 33.2 G/DL — SIGNIFICANT CHANGE UP (ref 32–37)
MCHC RBC-ENTMCNC: 34.8 PG — HIGH (ref 27–31)
MCV RBC AUTO: 104.8 FL — HIGH (ref 81–99)
NRBC # BLD: 0 /100 WBCS — SIGNIFICANT CHANGE UP (ref 0–0)
PLATELET # BLD AUTO: 255 K/UL — SIGNIFICANT CHANGE UP (ref 130–400)
PMV BLD: 10.1 FL — SIGNIFICANT CHANGE UP (ref 7.4–10.4)
POTASSIUM SERPL-MCNC: 3.8 MMOL/L — SIGNIFICANT CHANGE UP (ref 3.5–5)
POTASSIUM SERPL-SCNC: 3.8 MMOL/L — SIGNIFICANT CHANGE UP (ref 3.5–5)
RBC # BLD: 4.4 M/UL — SIGNIFICANT CHANGE UP (ref 4.2–5.4)
RBC # FLD: 18 % — HIGH (ref 11.5–14.5)
SODIUM SERPL-SCNC: 143 MMOL/L — SIGNIFICANT CHANGE UP (ref 135–146)
WBC # BLD: 5.4 K/UL — SIGNIFICANT CHANGE UP (ref 4.8–10.8)
WBC # FLD AUTO: 5.4 K/UL — SIGNIFICANT CHANGE UP (ref 4.8–10.8)

## 2024-09-22 PROCEDURE — 99233 SBSQ HOSP IP/OBS HIGH 50: CPT

## 2024-09-22 RX ORDER — SPIRONOLACTONE 100 MG/1
12.5 TABLET, FILM COATED ORAL DAILY
Refills: 0 | Status: DISCONTINUED | OUTPATIENT
Start: 2024-09-22 | End: 2024-09-25

## 2024-09-22 RX ADMIN — Medication 40 MILLIEQUIVALENT(S): at 12:01

## 2024-09-22 RX ADMIN — SACUBITRIL AND VALSARTAN 1 TABLET(S): 97; 103 TABLET, FILM COATED ORAL at 05:28

## 2024-09-22 RX ADMIN — SACUBITRIL AND VALSARTAN 1 TABLET(S): 97; 103 TABLET, FILM COATED ORAL at 17:23

## 2024-09-22 RX ADMIN — SPIRONOLACTONE 12.5 MILLIGRAM(S): 100 TABLET, FILM COATED ORAL at 17:23

## 2024-09-22 RX ADMIN — Medication 5000 UNIT(S): at 05:28

## 2024-09-22 RX ADMIN — Medication 25 MILLIGRAM(S): at 05:28

## 2024-09-22 RX ADMIN — FUROSEMIDE 40 MILLIGRAM(S): 10 INJECTION INTRAVENOUS at 05:28

## 2024-09-22 NOTE — PROGRESS NOTE ADULT - ASSESSMENT
Assessment:  68 yo F with a PMHx of Asthma, Rosacea and macular degeneration who presented to [facility] ED for progressively worsening SOB and orthopnea x 1 month. ProBNP 6863, Trop 14 > repeat pending. Bedside echo in ED with reduced EF. Patient is now admitted to cardiac telemetry for further management.     Problems discussed and associated plan:    #Shortness of breath  -continue to monitor on tele  -ECG:  bpm, TWI Inferolateral leads, wide QRS complex   -Trend troponin to peak (1st- 14 > 2nd 15)   - TTE- EF 21% Severe MR, severe LAE Multiple left ventricular regional wall motion abnormalities  - [facility] tomorrow for ischemic gordon banuelos aware, patient to be NPO after midnight   - c/w Lasix 40mg IVP daily - pt making good UOP  - Lipid panel WNL- LDL 53  - A1C 6.1%  - proBNP 6863  - CXR wnl  - strict I&Os, monitor daily weights  - fluid and salt restricted diet  - monitor electrolytes, Keep K > 4, Mg > 2  - start Toprol XL 25mg   - started Entresto 24/26mg on 9/20  - start spironolactone 25mg today   - Farxiga on discharge     #Asthma  -no home meds according to patient  -no wheezing on exam  -PRN Albuterol if needed    #Rosacea   -Patient uses Metro cream at home, okay to use inpatient    #Macular Degeneration  -stable  -continue home OTC drops    #Misc  -Heparin SQ for DVT prophylaxis- hold sunday night for C   -Patient endorses desire to be DNR/DNI     Please contact me with any questions or concerns at [phone].   Assessment: 70 yo F with a PMHx of Asthma, Rosacea and macular degeneration who presented to [facility] ED for progressively worsening SOB and orthopnea x 1 month. ProBNP 6863, Trop 14 > repeat pending. Bedside echo in ED with reduced EF. Patient is now admitted to cardiac telemetry for further management.     Problems discussed and associated plan:    #Shortness of breath  -continue to monitor on tele  -ECG:  bpm, TWI Inferolateral leads, wide QRS complex   -Trend troponin to peak (1st- 14 > 2nd 15)   - TTE- EF 21% Severe MR, severe LAE Multiple left ventricular regional wall motion abnormalities  - [facility] tomorrow for ischemic Mayela banuelos aware, patient to be NPO after midnight   - c/w Lasix 40mg IVP daily - pt making good UOP  - Lipid panel WNL- LDL 53  - A1C 6.1%  - proBNP 6863  - CXR wnl  - strict I&Os, monitor daily weights  - fluid and salt restricted diet  - monitor electrolytes, Keep K > 4, Mg > 2  - start Toprol XL 25mg   - started Entresto 24/26mg on 9/20  - start spironolactone 25mg today   - Farxiga on discharge     #Asthma  -no home meds according to patient  -no wheezing on exam  -PRN Albuterol if needed    #Rosacea   -Patient uses Metro cream at home, okay to use inpatient    #Macular Degeneration  -stable  -continue home OTC drops    #Misc  -Heparin SQ for DVT prophylaxis- hold Sunday night for C   -Patient endorses desire to be DNR/DNI     Please contact me with any questions or concerns at [phone].   Assessment: 70 yo F with a PMHx of Asthma, Rosacea and macular degeneration who presented to [facility] ED for progressively worsening SOB and orthopnea x 1 month. ProBNP 6863, Trop 14 > repeat pending. Bedside echo in ED with reduced EF. Patient is now admitted to cardiac telemetry for further management.     Problems discussed and associated plan:    #Shortness of breath  -continue to monitor on tele  -ECG:  bpm, TWI Inferolateral leads, wide QRS complex   -Trend troponin to peak (1st- 14 > 2nd 15)   - TTE- EF 21% Severe MR, severe LAE Multiple left ventricular regional wall motion abnormalities  - [facility] tomorrow for ischemic Mayela banuelos aware, patient to be NPO after midnight   - c/w Lasix 40mg IVP daily - pt making good UOP  - Lipid panel WNL- LDL 53  - A1C 6.1%  - proBNP 6863  - CXR wnl  - strict I&Os, monitor daily weights  - fluid and salt restricted diet  - monitor electrolytes, Keep K > 4, Mg > 2  - start Toprol XL 25mg   - started Entresto 24/26mg on 9/20  - start spironolactone 12.5mg today    - Farxiga on discharge     #Asthma  -no home meds according to patient  -no wheezing on exam  -PRN Albuterol if needed    #Rosacea   -Patient uses Metro cream at home, okay to use inpatient    #Macular Degeneration  -stable  -continue home OTC drops    #Misc  -Heparin SQ for DVT prophylaxis- hold Sunday night for C   -Patient endorses desire to be DNR/DNI     Please contact me with any questions or concerns at [phone].   Assessment: 68 yo F with a PMHx of Asthma, Rosacea and macular degeneration who presented to [facility] ED for progressively worsening SOB and orthopnea x 1 month. ProBNP 6863, Trop 14 > repeat pending. Bedside echo in ED with reduced EF. Patient is now admitted to cardiac telemetry for further management.     Problems discussed and associated plan:    #Shortness of breath  -continue to monitor on tele  -ECG:  bpm, TWI Inferolateral leads, wide QRS complex   -Trend troponin to peak (1st- 14 > 2nd 15)   - TTE- EF 21% Severe MR, severe LAE Multiple left ventricular regional wall motion abnormalities  - [facility] tomorrow for ischemic Mayela banuelos aware, patient to be NPO after midnight   - hold IV Lasix, pt euvolemic and plan for [facility] tomorrow   - Lipid panel WNL- LDL 53  - A1C 6.1%  - proBNP 6863  - CXR wnl  - strict I&Os, monitor daily weights  - fluid and salt restricted diet  - monitor electrolytes, Keep K > 4, Mg > 2  - start Toprol XL 25mg   - started Entresto 24/26mg on 9/20  - start spironolactone 12.5mg today    - Farxiga on discharge     #Asthma  -no home meds according to patient  -no wheezing on exam  -PRN Albuterol if needed    #Rosacea   -Patient uses Metro cream at home, okay to use inpatient    #Macular Degeneration  -stable  -continue home OTC drops    #Misc  -Heparin SQ for DVT prophylaxis- hold Sunday night for LHC   -Patient endorses desire to be DNR/DNI     Please contact me with any questions or concerns at [phone].

## 2024-09-22 NOTE — PROGRESS NOTE ADULT - SUBJECTIVE AND OBJECTIVE BOX
Chief complaint: Patient is a 69y old  Female who presents with a chief complaint of Shortness of breath (19 Sep 2024 13:43)    HPI: Mrs. Marita Cheney is a 70 yo F with a PMHx of Asthma, Rosacea and macular degeneration who presented to [facility] ED for progressively worsening SOB and orthopnea x 1 month. Patient states her symptoms began in July 2024, when patient had SOB and was seen in urgent care setting where she was treated for Asthma exacerbation despite lack of wheezing on exam. She states her symptoms did not improve so she saw her PCP for similar symptoms 2 weeks later, at that time she was retreated with steroid course and zpak, and patient endorses at that time she felt a little better. However over the past month her symptoms have returned and are progressively worsening. Patient states in the past week she has noticed new LE swelling around her feet/  ankles, decreased activity tolerance and orthopnea. She states she feels winded when laying flat and sometimes cannot walk up her stairs without stopping 2/2 shortness of breath. She denies wheezing.  Patient otherwise denies chest pain, palpitations, syncope, dizziness, recent travel or sick contacts. She endorses her current symptoms feel different from her usual asthma attacks, which prompted her presentation to the ED. She otherwise denies cardiac history. Patient does not have a cardiologist.     In the ED patients /72  O2 97% on room air, RR 18  Labs notable for CBC WNL, K 3.9, ProBNP 6863, Trop 14 > repeat 15  Cr 0.9  CXR:  No acute pulmonary process.  ECG:  bpm, TWI Inferolateral leads, wide QRS complex     Patient is now admitted to cardiac telemetry for further management.     Interval history: Patient seen and examined at bedside this AM. NAD. Good UOP on Lasix. Reports improvement in SOB and now able to lay flat. NPO after midnight for LHC.     Review of systems: A complete 10-point review of systems was obtained and is negative except as stated in the interval history.    Vitals:  T(C): 36.4 (22 Sep 2024 00:02), Max: 37.4 (21 Sep 2024 07:53)  T(F): 97.5 (22 Sep 2024 00:02), Max: 99.4 (21 Sep 2024 07:53)  HR: 94 (22 Sep 2024 05:31) (78 - 94)  BP: 106/66 (22 Sep 2024 05:31) (103/59 - 118/69)  BP(mean): 80 (22 Sep 2024 05:31) (76 - 88)  RR: 19 (22 Sep 2024 05:31) (18 - 19)  SpO2: 97% (22 Sep 2024 05:31) (96% - 97%)    O2 Parameters below as of 22 Sep 2024 05:31  Patient On (Oxygen Delivery Method): room air    Ins & outs:     09-19 @ 07:01  -  09-20 @ 07:00  --------------------------------------------------------  IN: 0 mL / OUT: 2360 mL / NET: -2360 mL      20 Sep 2024 07:01  -  21 Sep 2024 07:00  --------------------------------------------------------  IN: 440 mL / OUT: 1700 mL / NET: -1260 mL      21 Sep 2024 07:01  -  22 Sep 2024 07:00  --------------------------------------------------------  IN: 0 mL / OUT: 2200 mL / NET: -2200 mL      Weight trend:  Weight (kg): 89.9 (09-19)    Physical exam:  General: No apparent distress  HEENT: Anicteric sclera. Moist mucous membranes. .   Cardiac: Regular rate and rhythm. No murmurs, rubs, or gallops.   Vascular: Symmetric radial pulses. Dorsalis pedis pulses palpable.   Respiratory: Normal effort. Clear to auscultation bilaterally.   Abdomen: Soft, nontender. Audible bowel sounds.   Extremities: Warm with 1+ pedal/ ankle edema R>L. No cyanosis or clubbing.   Skin: Warm and dry. No rash.   Neurologic: Grossly normal motor function.   Psychiatric: Oriented to person, place, and time.     Data reviewed:  - Telemetry: SR/ST    - ECG 9/19/24: bpm, TWI Inferolateral leads, wide QRS complex   - Echo: pending  - CXR: 9/19/24 CXR No acute pulmonary process.       - Labs:                        14.7   5.45  )-----------( 231      ( 21 Sep 2024 06:24 )             44.8     09-21    144  |  107  |  18  ----------------------------<  109[H]  4.1   |  24  |  0.9    Ca    10.0      21 Sep 2024 06:24  Mg     2.1     09-21    TPro  7.0  /  Alb  4.2  /  TBili  1.4[H]  /  DBili  x   /  AST  30  /  ALT  37  /  AlkPhos  105  09-19    LIVER FUNCTIONS - ( 19 Sep 2024 12:11 )  Alb: 4.2 g/dL / Pro: 7.0 g/dL / ALK PHOS: 105 U/L / ALT: 37 U/L / AST: 30 U/L / GGT: x           Urinalysis Basic - ( 21 Sep 2024 06:24 )    Color: x / Appearance: x / SG: x / pH: x  Gluc: 109 mg/dL / Ketone: x  / Bili: x / Urobili: x   Blood: x / Protein: x / Nitrite: x   Leuk Esterase: x / RBC: x / WBC x   Sq Epi: x / Non Sq Epi: x / Bacteria: x      Medications:  MEDICATIONS  (STANDING):  furosemide   Injectable 40 milliGRAM(s) IV Push daily  heparin   Injectable 5000 Unit(s) SubCutaneous every 12 hours  sacubitril 24 mG/valsartan 26 mG 1 Tablet(s) Oral two times a day    MEDICATIONS  (PRN):   Chief complaint: Patient is a 69y old  Female who presents with a chief complaint of Shortness of breath (19 Sep 2024 13:43)    HPI: Mrs. Marita Cheney is a 70 yo F with a PMHx of Asthma, Rosacea and macular degeneration who presented to [facility] ED for progressively worsening SOB and orthopnea x 1 month. Patient states her symptoms began in July 2024, when patient had SOB and was seen in urgent care setting where she was treated for Asthma exacerbation despite lack of wheezing on exam. She states her symptoms did not improve so she saw her PCP for similar symptoms 2 weeks later, at that time she was retreated with steroid course and zpak, and patient endorses at that time she felt a little better. However over the past month her symptoms have returned and are progressively worsening. Patient states in the past week she has noticed new LE swelling around her feet/  ankles, decreased activity tolerance and orthopnea. She states she feels winded when laying flat and sometimes cannot walk up her stairs without stopping 2/2 shortness of breath. She denies wheezing.  Patient otherwise denies chest pain, palpitations, syncope, dizziness, recent travel or sick contacts. She endorses her current symptoms feel different from her usual asthma attacks, which prompted her presentation to the ED. She otherwise denies cardiac history. Patient does not have a cardiologist.     In the ED patients /72  O2 97% on room air, RR 18  Labs notable for CBC WNL, K 3.9, ProBNP 6863, Trop 14 > repeat 15  Cr 0.9  CXR:  No acute pulmonary process.  ECG:  bpm, TWI Inferolateral leads, wide QRS complex     Patient is now admitted to cardiac telemetry for further management.     Interval history: Patient seen and examined at bedside this AM. NAD. Good UOP on Lasix. Reports improvement in SOB and now able to lay flat. NPO after midnight for LHC.     Review of systems: A complete 10-point review of systems was obtained and is negative except as stated in the interval history.    Vitals:  T(C): 36.4 (22 Sep 2024 00:02), Max: 37.4 (21 Sep 2024 07:53)  T(F): 97.5 (22 Sep 2024 00:02), Max: 99.4 (21 Sep 2024 07:53)  HR: 94 (22 Sep 2024 05:31) (78 - 94)  BP: 106/66 (22 Sep 2024 05:31) (103/59 - 118/69)  BP(mean): 80 (22 Sep 2024 05:31) (76 - 88)  RR: 19 (22 Sep 2024 05:31) (18 - 19)  SpO2: 97% (22 Sep 2024 05:31) (96% - 97%)    O2 Parameters below as of 22 Sep 2024 05:31  Patient On (Oxygen Delivery Method): room air    Ins & outs:     09-19 @ 07:01  -  09-20 @ 07:00  --------------------------------------------------------  IN: 0 mL / OUT: 2360 mL / NET: -2360 mL      20 Sep 2024 07:01  -  21 Sep 2024 07:00  --------------------------------------------------------  IN: 440 mL / OUT: 1700 mL / NET: -1260 mL      21 Sep 2024 07:01  -  22 Sep 2024 07:00  --------------------------------------------------------  IN: 0 mL / OUT: 2200 mL / NET: -2200 mL      Weight trend:  Weight (kg): 89.9 (09-19)    Physical exam:  General: No apparent distress  HEENT: Anicteric sclera. Moist mucous membranes. .   Cardiac: Regular rate and rhythm. No murmurs, rubs, or gallops.   Vascular: Symmetric radial pulses. Dorsalis pedis pulses palpable.   Respiratory: Normal effort. Clear to auscultation bilaterally.   Abdomen: Soft, nontender. Audible bowel sounds.   Extremities: Warm with 1+ pedal/ ankle edema R>L. No cyanosis or clubbing.   Skin: Warm and dry. No rash.   Neurologic: Grossly normal motor function.   Psychiatric: Oriented to person, place, and time.     Data reviewed:  - Telemetry: SR/ST    - ECG 9/19/24: bpm, TWI Inferolateral leads, wide QRS complex   - Echo: pending  - CXR: 9/19/24 CXR No acute pulmonary process.     - Labs:                        15.3   5.40  )-----------( 255      ( 22 Sep 2024 06:37 )             46.1     09-22    143  |  105  |  17  ----------------------------<  127[H]  3.8   |  26  |  0.8    Ca    9.9      22 Sep 2024 06:37  Mg     2.0     09-22    Urinalysis Basic - ( 22 Sep 2024 06:37 )    Color: x / Appearance: x / SG: x / pH: x  Gluc: 127 mg/dL / Ketone: x  / Bili: x / Urobili: x   Blood: x / Protein: x / Nitrite: x   Leuk Esterase: x / RBC: x / WBC x   Sq Epi: x / Non Sq Epi: x / Bacteria: x    Medications:  MEDICATIONS  (STANDING):  furosemide   Injectable 40 milliGRAM(s) IV Push daily  metoprolol succinate ER 25 milliGRAM(s) Oral daily  potassium chloride    Tablet ER 40 milliEquivalent(s) Oral once  sacubitril 24 mG/valsartan 26 mG 1 Tablet(s) Oral two times a day    MEDICATIONS  (PRN):

## 2024-09-23 LAB
ANION GAP SERPL CALC-SCNC: 8 MMOL/L — SIGNIFICANT CHANGE UP (ref 7–14)
BUN SERPL-MCNC: 23 MG/DL — HIGH (ref 10–20)
CALCIUM SERPL-MCNC: 9.5 MG/DL — SIGNIFICANT CHANGE UP (ref 8.4–10.4)
CHLORIDE SERPL-SCNC: 109 MMOL/L — SIGNIFICANT CHANGE UP (ref 98–110)
CO2 SERPL-SCNC: 27 MMOL/L — SIGNIFICANT CHANGE UP (ref 17–32)
CREAT SERPL-MCNC: 0.8 MG/DL — SIGNIFICANT CHANGE UP (ref 0.7–1.5)
EGFR: 80 ML/MIN/1.73M2 — SIGNIFICANT CHANGE UP
GLUCOSE SERPL-MCNC: 130 MG/DL — HIGH (ref 70–99)
HCT VFR BLD CALC: 41.5 % — SIGNIFICANT CHANGE UP (ref 37–47)
HGB BLD-MCNC: 13.6 G/DL — SIGNIFICANT CHANGE UP (ref 12–16)
MAGNESIUM SERPL-MCNC: 2.2 MG/DL — SIGNIFICANT CHANGE UP (ref 1.8–2.4)
MCHC RBC-ENTMCNC: 32.8 G/DL — SIGNIFICANT CHANGE UP (ref 32–37)
MCHC RBC-ENTMCNC: 34.3 PG — HIGH (ref 27–31)
MCV RBC AUTO: 104.8 FL — HIGH (ref 81–99)
NRBC # BLD: 0 /100 WBCS — SIGNIFICANT CHANGE UP (ref 0–0)
PLATELET # BLD AUTO: 218 K/UL — SIGNIFICANT CHANGE UP (ref 130–400)
PMV BLD: 10.2 FL — SIGNIFICANT CHANGE UP (ref 7.4–10.4)
POTASSIUM SERPL-MCNC: 4.1 MMOL/L — SIGNIFICANT CHANGE UP (ref 3.5–5)
POTASSIUM SERPL-SCNC: 4.1 MMOL/L — SIGNIFICANT CHANGE UP (ref 3.5–5)
RBC # BLD: 3.96 M/UL — LOW (ref 4.2–5.4)
RBC # FLD: 17.9 % — HIGH (ref 11.5–14.5)
SODIUM SERPL-SCNC: 144 MMOL/L — SIGNIFICANT CHANGE UP (ref 135–146)
WBC # BLD: 4.79 K/UL — LOW (ref 4.8–10.8)
WBC # FLD AUTO: 4.79 K/UL — LOW (ref 4.8–10.8)

## 2024-09-23 PROCEDURE — 93458 L HRT ARTERY/VENTRICLE ANGIO: CPT | Mod: 26

## 2024-09-23 PROCEDURE — 99233 SBSQ HOSP IP/OBS HIGH 50: CPT

## 2024-09-23 RX ORDER — ASPIRIN 325 MG
324 TABLET ORAL ONCE
Refills: 0 | Status: COMPLETED | OUTPATIENT
Start: 2024-09-23 | End: 2024-09-23

## 2024-09-23 RX ORDER — SODIUM CHLORIDE 0.9 % (FLUSH) 0.9 %
1000 SYRINGE (ML) INJECTION
Refills: 0 | Status: DISCONTINUED | OUTPATIENT
Start: 2024-09-23 | End: 2024-09-23

## 2024-09-23 RX ORDER — SODIUM CHLORIDE 0.9 % (FLUSH) 0.9 %
1000 SYRINGE (ML) INJECTION
Refills: 0 | Status: DISCONTINUED | OUTPATIENT
Start: 2024-09-23 | End: 2024-09-25

## 2024-09-23 RX ADMIN — Medication 25 MILLIGRAM(S): at 06:05

## 2024-09-23 RX ADMIN — SPIRONOLACTONE 12.5 MILLIGRAM(S): 100 TABLET, FILM COATED ORAL at 06:03

## 2024-09-23 RX ADMIN — SACUBITRIL AND VALSARTAN 1 TABLET(S): 97; 103 TABLET, FILM COATED ORAL at 21:09

## 2024-09-23 RX ADMIN — SACUBITRIL AND VALSARTAN 1 TABLET(S): 97; 103 TABLET, FILM COATED ORAL at 06:02

## 2024-09-23 RX ADMIN — Medication 324 MILLIGRAM(S): at 15:21

## 2024-09-23 NOTE — PROGRESS NOTE ADULT - SUBJECTIVE AND OBJECTIVE BOX
Chief complaint: Patient is a 69y old  Female who presents with a chief complaint of Shortness of breath (19 Sep 2024 13:43)    HPI: Mrs. Marita Cheney is a 68 yo F with a PMHx of Asthma, Rosacea and macular degeneration who presented to [facility] ED for progressively worsening SOB and orthopnea x 1 month. Patient states her symptoms began in July 2024, when patient had SOB and was seen in urgent care setting where she was treated for Asthma exacerbation despite lack of wheezing on exam. She states her symptoms did not improve so she saw her PCP for similar symptoms 2 weeks later, at that time she was retreated with steroid course and zpak, and patient endorses at that time she felt a little better. However over the past month her symptoms have returned and are progressively worsening. Patient states in the past week she has noticed new LE swelling around her feet/  ankles, decreased activity tolerance and orthopnea. She states she feels winded when laying flat and sometimes cannot walk up her stairs without stopping 2/2 shortness of breath. She denies wheezing.  Patient otherwise denies chest pain, palpitations, syncope, dizziness, recent travel or sick contacts. She endorses her current symptoms feel different from her usual asthma attacks, which prompted her presentation to the ED. She otherwise denies cardiac history. Patient does not have a cardiologist.     In the ED patients /72  O2 97% on room air, RR 18  Labs notable for CBC WNL, K 3.9, ProBNP 6863, Trop 14 > repeat 15  Cr 0.9  CXR:  No acute pulmonary process.  ECG:  bpm, TWI Inferolateral leads, wide QRS complex     Patient is now admitted to cardiac telemetry for further management.     Interval history: Patient seen and examined at bedside this AM. NAD. Good UOP on Lasix. Reports improvement in SOB and now able to lay flat. NPO after midnight for LHC.     Review of systems: A complete 10-point review of systems was obtained and is negative except as stated in the interval history.    Vitals:  T(C): 36.4 (22 Sep 2024 00:02), Max: 37.4 (21 Sep 2024 07:53)  T(F): 97.5 (22 Sep 2024 00:02), Max: 99.4 (21 Sep 2024 07:53)  HR: 94 (22 Sep 2024 05:31) (78 - 94)  BP: 106/66 (22 Sep 2024 05:31) (103/59 - 118/69)  BP(mean): 80 (22 Sep 2024 05:31) (76 - 88)  RR: 19 (22 Sep 2024 05:31) (18 - 19)  SpO2: 97% (22 Sep 2024 05:31) (96% - 97%)    O2 Parameters below as of 22 Sep 2024 05:31  Patient On (Oxygen Delivery Method): room air    Ins & outs:     09-19 @ 07:01  -  09-20 @ 07:00  --------------------------------------------------------  IN: 0 mL / OUT: 2360 mL / NET: -2360 mL      20 Sep 2024 07:01  -  21 Sep 2024 07:00  --------------------------------------------------------  IN: 440 mL / OUT: 1700 mL / NET: -1260 mL      21 Sep 2024 07:01  -  22 Sep 2024 07:00  --------------------------------------------------------  IN: 0 mL / OUT: 2200 mL / NET: -2200 mL      Weight trend:  Weight (kg): 89.9 (09-19)    Physical exam:  General: No apparent distress  HEENT: Anicteric sclera. Moist mucous membranes. .   Cardiac: Regular rate and rhythm. No murmurs, rubs, or gallops.   Vascular: Symmetric radial pulses. Dorsalis pedis pulses palpable.   Respiratory: Normal effort. Clear to auscultation bilaterally.   Abdomen: Soft, nontender. Audible bowel sounds.   Extremities: Warm with 1+ pedal/ ankle edema R>L. No cyanosis or clubbing.   Skin: Warm and dry. No rash.   Neurologic: Grossly normal motor function.   Psychiatric: Oriented to person, place, and time.     Data reviewed:  - Telemetry: SR/ST    - ECG 9/19/24: bpm, TWI Inferolateral leads, wide QRS complex   - Echo: pending  - CXR: 9/19/24 CXR No acute pulmonary process.     - Labs:                        15.3   5.40  )-----------( 255      ( 22 Sep 2024 06:37 )             46.1     09-22    143  |  105  |  17  ----------------------------<  127[H]  3.8   |  26  |  0.8    Ca    9.9      22 Sep 2024 06:37  Mg     2.0     09-22    Urinalysis Basic - ( 22 Sep 2024 06:37 )    Color: x / Appearance: x / SG: x / pH: x  Gluc: 127 mg/dL / Ketone: x  / Bili: x / Urobili: x   Blood: x / Protein: x / Nitrite: x   Leuk Esterase: x / RBC: x / WBC x   Sq Epi: x / Non Sq Epi: x / Bacteria: x    Medications:  MEDICATIONS  (STANDING):  furosemide   Injectable 40 milliGRAM(s) IV Push daily  metoprolol succinate ER 25 milliGRAM(s) Oral daily  potassium chloride    Tablet ER 40 milliEquivalent(s) Oral once  sacubitril 24 mG/valsartan 26 mG 1 Tablet(s) Oral two times a day    MEDICATIONS  (PRN):     Chief complaint: Patient is a 69y old  Female who presents with a chief complaint of Shortness of breath (19 Sep 2024 13:43)    HPI: Mrs. Marita Cheney is a 70 yo F with a PMHx of Asthma, Rosacea and macular degeneration who presented to [facility] ED for progressively worsening SOB and orthopnea x 1 month. Patient states her symptoms began in July 2024, when patient had SOB and was seen in urgent care setting where she was treated for Asthma exacerbation despite lack of wheezing on exam. She states her symptoms did not improve so she saw her PCP for similar symptoms 2 weeks later, at that time she was retreated with steroid course and zpak, and patient endorses at that time she felt a little better. However over the past month her symptoms have returned and are progressively worsening. Patient states in the past week she has noticed new LE swelling around her feet/  ankles, decreased activity tolerance and orthopnea. She states she feels winded when laying flat and sometimes cannot walk up her stairs without stopping 2/2 shortness of breath. She denies wheezing.  Patient otherwise denies chest pain, palpitations, syncope, dizziness, recent travel or sick contacts. She endorses her current symptoms feel different from her usual asthma attacks, which prompted her presentation to the ED. She otherwise denies cardiac history. Patient does not have a cardiologist.     In the ED patients /72  O2 97% on room air, RR 18  Labs notable for CBC WNL, K 3.9, ProBNP 6863, Trop 14 > repeat 15  Cr 0.9  CXR:  No acute pulmonary process.  ECG:  bpm, TWI Inferolateral leads, wide QRS complex     Patient is now admitted to cardiac telemetry for further management.     Interval history: Patient seen and examined at bedside this AM in NAD. No overnight events. [facility] today.     Review of systems: A complete 10-point review of systems was obtained and is negative except as stated in the interval history.    Vitals:  ICU Vital Signs Last 24 Hrs  T(C): 36.5 (23 Sep 2024 07:30), Max: 36.8 (22 Sep 2024 23:50)  T(F): 97.7 (23 Sep 2024 07:30), Max: 98.2 (22 Sep 2024 23:50)  HR: 78 (23 Sep 2024 07:30) (77 - 97)  BP: 88/51 (23 Sep 2024 07:30) (88/51 - 106/65)  BP(mean): 63 (23 Sep 2024 07:30) (63 - 80)  ABP: --  ABP(mean): --  RR: 18 (23 Sep 2024 07:30) (18 - 18)  SpO2: 90% (23 Sep 2024 05:23) (90% - 98%)    O2 Parameters below as of 23 Sep 2024 05:23  Patient On (Oxygen Delivery Method): room air      Ins & outs:     I&O's Summary    22 Sep 2024 07:01  -  23 Sep 2024 07:00  --------------------------------------------------------  IN: 560 mL / OUT: 1550 mL / NET: -990 mL      Weight trend:  Weight (kg): 86.4 kg 9/23     Physical exam:  General: No apparent distress  HEENT: Anicteric sclera. Moist mucous membranes.   Cardiac: Regular rate and rhythm. No murmurs, rubs, or gallops.   Vascular: Symmetric radial pulses. Dorsalis pedis pulses palpable.   Respiratory: Normal effort. Clear to auscultation bilaterally.   Abdomen: Soft, nontender. Audible bowel sounds.   Extremities: Warm with 1+ pedal/ ankle edema R>L. No cyanosis or clubbing.   Skin: Warm and dry. No rash.   Neurologic: Grossly normal motor function.   Psychiatric: Oriented to person, place, and time.     Data reviewed:  - Telemetry: SR 80-104bpm   - ECG 9/19/24: bpm, TWI Inferolateral leads, wide QRS complex   - Echo:   9/20/24   1. Moderate to severely increased left ventricular internal cavity size.   2. Severely decreased global left ventricular systolic function.   3. LV Ejection Fraction by Nam's Method with a biplane EF of 21 %.   4. Multiple left ventricular regional wall motion abnormalities exist.   See wall motion findings.   5. The right ventricular size is normal. RV systolic function is low   normal.   6. Severely enlarged left atrium.   7. Severe mitral valve regurgitation.   8. Mild-moderate tricuspid regurgitation.   9. Estimated pulmonary artery systolic pressure is 52.1 mmHg assuming a   right atrial pressure of 8 mmHg, which is consistent with moderate   pulmonary hypertension.  10. Endocardial visualization was enhanced with intravenous echo contrast.  - CXR: 9/19/24 CXR No acute pulmonary process.   - Cath: pending     - Labs:                        13.6   4.79  )-----------( 218      ( 23 Sep 2024 05:28 )             41.5     09-23    144  |  109  |  23[H]  ----------------------------<  130[H]  4.1   |  27  |  0.8    Ca    9.5      23 Sep 2024 05:28  Mg     2.2     09-23    Lactate Trend    Urinalysis Basic - ( 23 Sep 2024 05:28 )    Color: x / Appearance: x / SG: x / pH: x  Gluc: 130 mg/dL / Ketone: x  / Bili: x / Urobili: x   Blood: x / Protein: x / Nitrite: x   Leuk Esterase: x / RBC: x / WBC x   Sq Epi: x / Non Sq Epi: x / Bacteria: x      Medications:  MEDICATIONS  (STANDING):  metoprolol succinate ER 25 milliGRAM(s) Oral daily  sacubitril 24 mG/valsartan 26 mG 1 Tablet(s) Oral two times a day  spironolactone 12.5 milliGRAM(s) Oral daily    MEDICATIONS  (PRN):

## 2024-09-23 NOTE — CHART NOTE - NSCHARTNOTEFT_GEN_A_CORE
PRE-OP DIAGNOSIS:  Systolic Cardiomyopathy    PROCEDURE:   [x ] Coronary Angiogram   [x ] LHC   [ ] LVG   [\X] RHC   [x ] Intervention (see below)       PHYSICIAN:  Dr. Pedro  FELLOW: Brent Mtz     PROCEDURE DESCRIPTION:     Consent:    [x] Patient   [] Family Member   []  Used      Anesthesia:   [ ] General   [X] Sedation   [X] Local     Access & Closure:   [ ]   Fr R    L    Radial Artery  -> D-stat   TR Band   [ ]   Fr R    L    Femoral Artery -> Manual compression    Perclose    Angioseal  [ ]   Fr R    L   Femoral Vein -> Manual compression  [ ]   Fr R    L    Brachial Vein -> Manual compression    IV Contrast: mL      Intervention:  Successful PCI of  with balloon angioplasty s/p BLAS x    Implants:    to                  AUC:     FINDINGS:   Coronary Dominance:   LM:   LAD:   CX:   Ramus:   RCA:   LIMA:   SVG:     LVEDP:  mmHg     EF:  %     PA % sat:  RV % sat:  RA % sat:  FA % sat:    RA pressure:  mmHg  RV pressure:  mmHg  PA pressure:  mmHg  PCWP:  mmHg    CVP:  mmHg  CO/CI Renetta:   CO/CI Thermodilution:  /  PVR (woods units):   SVR (woods units):      AV gradient:  mmHg  AV area:  cm2    MV gradient:  mmHg  MV area:  cm2     ESTIMATED BLOOD LOSS: < 10 mL      CONDITION:   [x] Good   [ ] Fair   [ ] Critical     SPECIMEN REMOVED: N/A     POST-OP DIAGNOSIS:    [ ] Normal Coronary Angiogram   [ ] Mild Coronary Artery Disease (< 50% stenosis)   [ ] 1-  2-  3- Vessel Coronary Artery Disease      PLAN OF CARE:   [ ] Return to In-patient bed   [ ] Return for Staged Procedure in 4-6 weeks   [ ] CT Surgery Consult   [X] Medications: ASA,  statin  [X] IV Fluids: NS @ 50cc/h x 6 hours  [ ] EP Consult  [x] Remove D-stat   TR band    femoral sheath and Hold manual pressure if signs of hematoma or bleeding over radial     femoral access site. PRE-OP DIAGNOSIS:  Systolic Cardiomyopathy    PROCEDURE:   [x ] Coronary Angiogram   [x ] LHC   [ ] LVG   [] RHC   [] Intervention (see below)       PHYSICIAN:  Dr. Pedro  FELLOW: Brent Mtz     PROCEDURE DESCRIPTION:     Consent:    [x] Patient   [] Family Member   []  Used      Anesthesia:   [ ] General   [X] Sedation   [X] Local     Access & Closure:   [X]   6Fr R  Radial Artery  -> TR Band   [x]   6Fr R  Brachial Vein -> Manual compression    IV Contrast: 35 mL     FINDINGS:   Coronary Dominance: Left  LM: short segment, no disease  LAD: no disease  CX: no disease  RCA: no disease    LVEDP: 11 mmHg     EF:  21% (echo 09/20/24)       ESTIMATED BLOOD LOSS: < 10 mL      CONDITION:   [x] Good   [ ] Fair   [ ] Critical     SPECIMEN REMOVED: N/A     POST-OP DIAGNOSIS:    [X] Normal Coronary Angiogram      PLAN OF CARE:   [X] Return to In-patient bed   [X] Medications: as per primary team   [X] IV Fluids: NS @ 75cc/h x 3 hours  [x] Remove TR band

## 2024-09-23 NOTE — CHART NOTE - NSCHARTNOTEFT_GEN_A_CORE
INTERVENTIONAL CARDIOLOGY NP:                                               PREOPERATIVE DAY OF PROCEDURE EVALUATION:  I have personally seen and examined the patient.  I agree with the history and physical which I have reviewed and noted any changes below.           HPI: Mrs. Marita Cheney is a 70 yo F with a PMHx of Asthma, Rosacea and macular degeneration who presented to [facility] ED for progressively worsening SOB and orthopnea x 1 month. Patient states her symptoms began in July 2024, when patient had SOB and was seen in urgent care setting where she was treated for Asthma exacerbation despite lack of wheezing on exam. She states her symptoms did not improve so she saw her PCP for similar symptoms 2 weeks later, at that time she was retreated with steroid course and zpak, and patient endorses at that time she felt a little better. However over the past month her symptoms have returned and are progressively worsening. Patient states in the past week she has noticed new LE swelling around her feet/  ankles, decreased activity tolerance and orthopnea. She states she feels winded when laying flat and sometimes cannot walk up her stairs without stopping 2/2 shortness of breath. She denies wheezing.  Patient otherwise denies chest pain, palpitations, syncope, dizziness, recent travel or sick contacts. She endorses her current symptoms feel different from her usual asthma attacks, which prompted her presentation to the ED. She otherwise denies cardiac history. Patient does not have a cardiologist.     In the ED patients /72  O2 97% on room air, RR 18  Labs notable for CBC WNL, K 3.9, ProBNP 6863, Trop 14 > repeat 15  Cr 0.9  CXR:  No acute pulmonary process.  ECG:  bpm, TWI Inferolateral leads, wide QRS complex       69yoF with asthma and macular degeneration who presented with 2 months of orthopnea and dyspnea. TTE dated 9/20/24 demonstrates LVEF 21%, low normal RV systolic function, severe LAE, severe MR, mild-moderate TR, and PASP 52.1 mmHg. I would like to prioritize diuresis and afterload reduction, with a goal of getting the patient euvolemic by Monday for L/RHC.     Plan:   - Continue Lasix 40 mg IV daily, Toprol 25 mg daily, Entresto 24/26 mg BID, spironolactone 12.5 mg daily   - Plan for L/RHC Today  - Pt given asa 324mg po x 1   Bleeding Risk Score:   1.2%    R Julio Cesar Test: positive     Pre Cath hydration: none d/t EF 21%      Anti- Anginal medications:                        [ ] not used d/t                    [  ] used:  [  ]CCB  [ x ] BB  [ ] Nitrate [ ] Ranexa  (only 1 AA d/t /73)       [ ] not used but strong indication to use     (Signed electronically by __________)  09-23-24 @ 15:23

## 2024-09-23 NOTE — PROGRESS NOTE ADULT - ASSESSMENT
Assessment: 68 yo F with a PMHx of Asthma, Rosacea and macular degeneration who presented to [facility] ED for progressively worsening SOB and orthopnea x 1 month. ProBNP 6863, Trop 14 > repeat pending. Bedside echo in ED with reduced EF. Patient is now admitted to cardiac telemetry for further management.     Problems discussed and associated plan:    #Shortness of breath  -continue to monitor on tele  -ECG:  bpm, TWI Inferolateral leads, wide QRS complex   -Trend troponin to peak (1st- 14 > 2nd 15)   - TTE- EF 21% Severe MR, severe LAE Multiple left ventricular regional wall motion abnormalities  - [facility] tomorrow for ischemic Mayela banuelos aware, patient to be NPO after midnight   - hold IV Lasix, pt euvolemic and plan for [facility] tomorrow   - Lipid panel WNL- LDL 53  - A1C 6.1%  - proBNP 6863  - CXR wnl  - strict I&Os, monitor daily weights  - fluid and salt restricted diet  - monitor electrolytes, Keep K > 4, Mg > 2  - start Toprol XL 25mg   - started Entresto 24/26mg on 9/20  - start spironolactone 12.5mg today    - Farxiga on discharge     #Asthma  -no home meds according to patient  -no wheezing on exam  -PRN Albuterol if needed    #Rosacea   -Patient uses Metro cream at home, okay to use inpatient    #Macular Degeneration  -stable  -continue home OTC drops    #Misc  -Heparin SQ for DVT prophylaxis- hold Sunday night for LHC   -Patient endorses desire to be DNR/DNI     Please contact me with any questions or concerns at [phone].   Assessment: 68 yo F with a PMHx of Asthma, Rosacea and macular degeneration who presented to [facility] ED for progressively worsening SOB and orthopnea x 1 month. ProBNP 6863, Trop 14 > repeat pending. Bedside echo in ED with reduced EF. Patient is now admitted to cardiac telemetry for further management.     Problems discussed and associated plan:    #Shortness of breath  -continue to monitor on tele  -ECG:  bpm, TWI Inferolateral leads, wide QRS complex   -Trend troponin to peak (1st- 14 > 2nd 15)   - TTE: EF 21% Severe MR, severe LAE Multiple left ventricular regional wall motion abnormalities  - [facility] on 9/23   - hold IV Lasix, pt euvolemic   - Lipid panel WNL- LDL 53  - A1C 6.1%  - proBNP 6863  - CXR wnl  - strict I&Os, monitor daily weights  - fluid and salt restricted diet  - monitor electrolytes, Keep K > 4, Mg > 2  - c/w Toprol XL 25mg   - c/w  Entresto 24/26mg started on 9/20  - c/w spironolactone 12.5mg   - Farxiga on discharge     #Asthma  -no home meds according to patient  -no wheezing on exam  -PRN Albuterol if needed    #Rosacea   -Patient uses Metro cream at home, okay to use inpatient    #Macular Degeneration  -stable  -continue home OTC drops    #Misc  -Heparin SQ for DVT prophylaxis- hold for [facility]   -Patient endorses desire to be DNR/DNI     Please contact me with any questions or concerns at [phone].

## 2024-09-24 ENCOUNTER — TRANSCRIPTION ENCOUNTER (OUTPATIENT)
Age: 69
End: 2024-09-24

## 2024-09-24 LAB
ANION GAP SERPL CALC-SCNC: 13 MMOL/L — SIGNIFICANT CHANGE UP (ref 7–14)
BUN SERPL-MCNC: 19 MG/DL — SIGNIFICANT CHANGE UP (ref 10–20)
CALCIUM SERPL-MCNC: 9.4 MG/DL — SIGNIFICANT CHANGE UP (ref 8.4–10.5)
CHLORIDE SERPL-SCNC: 108 MMOL/L — SIGNIFICANT CHANGE UP (ref 98–110)
CO2 SERPL-SCNC: 21 MMOL/L — SIGNIFICANT CHANGE UP (ref 17–32)
CREAT SERPL-MCNC: 0.7 MG/DL — SIGNIFICANT CHANGE UP (ref 0.7–1.5)
EGFR: 94 ML/MIN/1.73M2 — SIGNIFICANT CHANGE UP
GLUCOSE SERPL-MCNC: 104 MG/DL — HIGH (ref 70–99)
HCT VFR BLD CALC: 42.9 % — SIGNIFICANT CHANGE UP (ref 37–47)
HGB BLD-MCNC: 14 G/DL — SIGNIFICANT CHANGE UP (ref 12–16)
MAGNESIUM SERPL-MCNC: 2.4 MG/DL — SIGNIFICANT CHANGE UP (ref 1.8–2.4)
MCHC RBC-ENTMCNC: 32.6 G/DL — SIGNIFICANT CHANGE UP (ref 32–37)
MCHC RBC-ENTMCNC: 34.7 PG — HIGH (ref 27–31)
MCV RBC AUTO: 106.5 FL — HIGH (ref 81–99)
NRBC # BLD: 0 /100 WBCS — SIGNIFICANT CHANGE UP (ref 0–0)
PLATELET # BLD AUTO: 203 K/UL — SIGNIFICANT CHANGE UP (ref 130–400)
PMV BLD: 10 FL — SIGNIFICANT CHANGE UP (ref 7.4–10.4)
POTASSIUM SERPL-MCNC: 4.4 MMOL/L — SIGNIFICANT CHANGE UP (ref 3.5–5)
POTASSIUM SERPL-SCNC: 4.4 MMOL/L — SIGNIFICANT CHANGE UP (ref 3.5–5)
RBC # BLD: 4.03 M/UL — LOW (ref 4.2–5.4)
RBC # FLD: 18 % — HIGH (ref 11.5–14.5)
SODIUM SERPL-SCNC: 142 MMOL/L — SIGNIFICANT CHANGE UP (ref 135–146)
WBC # BLD: 4.95 K/UL — SIGNIFICANT CHANGE UP (ref 4.8–10.8)
WBC # FLD AUTO: 4.95 K/UL — SIGNIFICANT CHANGE UP (ref 4.8–10.8)

## 2024-09-24 PROCEDURE — 99233 SBSQ HOSP IP/OBS HIGH 50: CPT

## 2024-09-24 RX ORDER — DAPAGLIFLOZIN 10 MG/1
10 TABLET, FILM COATED ORAL DAILY
Refills: 0 | Status: DISCONTINUED | OUTPATIENT
Start: 2024-09-24 | End: 2024-09-25

## 2024-09-24 RX ADMIN — Medication 25 MILLIGRAM(S): at 05:55

## 2024-09-24 RX ADMIN — SACUBITRIL AND VALSARTAN 1 TABLET(S): 97; 103 TABLET, FILM COATED ORAL at 17:36

## 2024-09-24 RX ADMIN — DAPAGLIFLOZIN 10 MILLIGRAM(S): 10 TABLET, FILM COATED ORAL at 11:08

## 2024-09-24 RX ADMIN — SACUBITRIL AND VALSARTAN 1 TABLET(S): 97; 103 TABLET, FILM COATED ORAL at 05:55

## 2024-09-24 RX ADMIN — SPIRONOLACTONE 12.5 MILLIGRAM(S): 100 TABLET, FILM COATED ORAL at 05:55

## 2024-09-24 NOTE — PROGRESS NOTE ADULT - SUBJECTIVE AND OBJECTIVE BOX
Chief complaint: Patient is a 69y old  Female who presents with a chief complaint of Shortness of breath (19 Sep 2024 13:43)    HPI: Mrs. Marita Cheney is a 68 yo F with a PMHx of Asthma, Rosacea and macular degeneration who presented to [facility] ED for progressively worsening SOB and orthopnea x 1 month. Patient states her symptoms began in July 2024, when patient had SOB and was seen in urgent care setting where she was treated for Asthma exacerbation despite lack of wheezing on exam. She states her symptoms did not improve so she saw her PCP for similar symptoms 2 weeks later, at that time she was retreated with steroid course and zpak, and patient endorses at that time she felt a little better. However over the past month her symptoms have returned and are progressively worsening. Patient states in the past week she has noticed new LE swelling around her feet/  ankles, decreased activity tolerance and orthopnea. She states she feels winded when laying flat and sometimes cannot walk up her stairs without stopping 2/2 shortness of breath. She denies wheezing.  Patient otherwise denies chest pain, palpitations, syncope, dizziness, recent travel or sick contacts. She endorses her current symptoms feel different from her usual asthma attacks, which prompted her presentation to the ED. She otherwise denies cardiac history. Patient does not have a cardiologist.     In the ED patients /72  O2 97% on room air, RR 18  Labs notable for CBC WNL, K 3.9, ProBNP 6863, Trop 14 > repeat 15  Cr 0.9  CXR:  No acute pulmonary process.  ECG:  bpm, TWI Inferolateral leads, wide QRS complex     Patient is now admitted to cardiac telemetry for further management.     Interval history: Patient seen and examined at bedside this AM in NAD. No overnight events. [facility] today.     Review of systems: A complete 10-point review of systems was obtained and is negative except as stated in the interval history.    Vitals:  Vital Signs Last 24 Hrs  T(C): 36.3 (24 Sep 2024 07:35), Max: 36.4 (23 Sep 2024 14:51)  T(F): 97.3 (24 Sep 2024 07:35), Max: 97.6 (23 Sep 2024 14:51)  HR: 89 (24 Sep 2024 07:35) (70 - 92)  BP: 109/65 (24 Sep 2024 07:35) (93/63 - 118/70)  BP(mean): 82 (24 Sep 2024 07:35) (70 - 82)  RR: 18 (24 Sep 2024 04:41) (12 - 19)  SpO2: 95% (24 Sep 2024 04:41) (94% - 98%)    Parameters below as of 24 Sep 2024 04:41  Patient On (Oxygen Delivery Method): room air      Ins & outs:   I&O's Summary    23 Sep 2024 07:01  -  24 Sep 2024 07:00  --------------------------------------------------------  IN: 150 mL / OUT: 450 mL / NET: -300 mL        Physical exam:  General: No apparent distress  HEENT: Anicteric sclera. Moist mucous membranes.   Cardiac: Regular rate and rhythm. No murmurs, rubs, or gallops.   Vascular: Symmetric radial pulses. Dorsalis pedis pulses palpable.   Respiratory: Normal effort. Clear to auscultation bilaterally.   Abdomen: Soft, nontender. Audible bowel sounds.   Extremities: Warm with 1+ pedal/ ankle edema R>L. No cyanosis or clubbing.   Skin: Warm and dry. No rash.   Neurologic: Grossly normal motor function.   Psychiatric: Oriented to person, place, and time.     Data reviewed:  - Telemetry: SR 80-104bpm   - ECG 9/19/24: bpm, TWI Inferolateral leads, wide QRS complex   - Echo:   9/20/24   1. Moderate to severely increased left ventricular internal cavity size.   2. Severely decreased global left ventricular systolic function.   3. LV Ejection Fraction by Nam's Method with a biplane EF of 21 %.   4. Multiple left ventricular regional wall motion abnormalities exist.   See wall motion findings.   5. The right ventricular size is normal. RV systolic function is low   normal.   6. Severely enlarged left atrium.   7. Severe mitral valve regurgitation.   8. Mild-moderate tricuspid regurgitation.    - Labs:                        14.0   4.95  )-----------( 203      ( 24 Sep 2024 06:22 )             42.9     09-24    142  |  108  |  19  ----------------------------<  104[H]  4.4   |  21  |  0.7    Ca    9.4      24 Sep 2024 06:22  Mg     2.4     09-24        Lactate Trend    Urinalysis Basic - ( 24 Sep 2024 06:22 )    Color: x / Appearance: x / SG: x / pH: x  Gluc: 104 mg/dL / Ketone: x  / Bili: x / Urobili: x   Blood: x / Protein: x / Nitrite: x   Leuk Esterase: x / RBC: x / WBC x   Sq Epi: x / Non Sq Epi: x / Bacteria: x     9. Estimated pulmonary artery systolic pressure is 52.1 mmHg assuming a   right atrial pressure of 8 mmHg, which is consistent with moderate   pulmonary hypertension.  10. Endocardial visualization was enhanced with intravenous echo contrast.  - CXR: 9/19/24 CXR No acute pulmonary process.   - Cath: pending                Medications:  MEDICATIONS  (STANDING):  metoprolol succinate ER 25 milliGRAM(s) Oral daily  sacubitril 24 mG/valsartan 26 mG 1 Tablet(s) Oral two times a day  spironolactone 12.5 milliGRAM(s) Oral daily    MEDICATIONS  (PRN):

## 2024-09-24 NOTE — PROGRESS NOTE ADULT - ASSESSMENT
Assessment: 70 yo F with a PMHx of Asthma, Rosacea and macular degeneration who presented to [facility] ED for progressively worsening SOB and orthopnea x 1 month. ProBNP 6863, Trop 14 > repeat pending. Bedside echo in ED with reduced EF. Patient is now admitted to cardiac telemetry for further management.     Problems discussed and associated plan:    #Shortness of breath  -continue to monitor on tele  -ECG:  bpm, TWI Inferolateral leads, wide QRS complex   -Trend troponin to peak (1st- 14 > 2nd 15)   - TTE: EF 21% Severe MR, severe LAE Multiple left ventricular regional wall motion abnormalities  - [facility] on 9/23   - hold IV Lasix, pt euvolemic   - Lipid panel WNL- LDL 53  - A1C 6.1%  - proBNP 6863  - CXR wnl  - strict I&Os, monitor daily weights  - fluid and salt restricted diet  - monitor electrolytes, Keep K > 4, Mg > 2  - c/w Toprol XL 25mg   - c/w  Entresto 24/26mg started on 9/20  - c/w spironolactone 12.5mg   - Farxiga added on 9/24    #Asthma  -no home meds according to patient  -no wheezing on exam  -PRN Albuterol if needed    #Rosacea   -Patient uses Metro cream at home, okay to use inpatient    #Macular Degeneration  -stable  -continue home OTC drops    #Misc  -Heparin SQ for DVT prophylaxis- hold for [facility]   -Patient endorses desire to be DNR/DNI     Please contact me with any questions or concerns at [phone].

## 2024-09-24 NOTE — DISCHARGE NOTE PROVIDER - NSDCMRMEDTOKEN_GEN_ALL_CORE_FT
MetroCream 0.75% topical cream: Apply topically to affected area 2 times a day  PreserVision AREDS 2 oral capsule: orally 2 times a day   dapagliflozin 10 mg oral tablet: 1 tab(s) orally once a day  Lasix 20 mg oral tablet: 1 tab(s) orally once a day as needed for  shortness of breath and/or wheezing If patient notices 1-2lb weight gain in a day or 5lbs weight gain in a week or sxs of swelling  metoprolol succinate 25 mg oral tablet, extended release: 1 tab(s) orally once a day  MetroCream 0.75% topical cream: Apply topically to affected area 2 times a day  PreserVision AREDS 2 oral capsule: orally 2 times a day  sacubitril-valsartan 24 mg-26 mg oral tablet: 1 tab(s) orally 2 times a day  spironolactone 25 mg oral tablet: 0.5 tab(s) orally once a day

## 2024-09-24 NOTE — DISCHARGE NOTE PROVIDER - CARE PROVIDER_API CALL
Shanita Love  Cardiovascular Disease  [address] [place] [id number]  Phone: [phone]  Fax: [phone]  Follow Up Time: 2 weeks

## 2024-09-24 NOTE — DISCHARGE NOTE PROVIDER - NSDCCPCAREPLAN_GEN_ALL_CORE_FT
PRINCIPAL DISCHARGE DIAGNOSIS  Diagnosis: Acute CHF  Assessment and Plan of Treatment: Please continue the following medications:   Metoprolol  ER 25 mg daily  Entresto 24/26mg BID  Spironolactone 12.5 mg daily  Farxiga 10 mg daily  Please follow up with cardiologist Dr. Shanita Love in 2 weeks.     PRINCIPAL DISCHARGE DIAGNOSIS  Diagnosis: Acute CHF  Assessment and Plan of Treatment:   Please continue the following medications:   Metoprolol  ER 25 mg daily  Entresto 24/26mg BID  Spironolactone 12.5 mg daily  Farxiga 10 mg daily  Lasix 20mg PRN if pt notices a 1-2 lb weight gain in a day or 5lbs in a week or signs of swelling   Please follow up with cardiologist Dr. Shanita Love in 2 weeks.

## 2024-09-24 NOTE — DISCHARGE NOTE PROVIDER - ATTENDING DISCHARGE PHYSICAL EXAMINATION:
Patient seen and examined. Pertinent labs, imaging and telemetry reviewed. I agree with the above:     Patient feeling well. No new complaints.   RRR. S1S2 present.   CTA B/L   LE WWP without edema.     Patient is stable for discharge home with outpatient follow up with Dr. Love.

## 2024-09-24 NOTE — DISCHARGE NOTE PROVIDER - NSDCHHCONTACT_GEN_ALL_CORE_FT
[FreeTextEntry1] : Judah is a 32 years old male here for RBL.\par \par Colonoscopy 02/16/22 - Dr. Rob Uriarte = anal fissure in the anus. Internal hemorrhoids. Polyp (5 mm) in the rectosigmoid junction. (Polypectomy).\par \par Last seen 12/16/22 - Perianal inspection digital rectal examination and anoscopy reveal enlarged circumferential friable prolapsing internal hemorrhoids with evidence of recent bleeding. Right anterior right posterior and left lateral positions RBL's performed. \par \par Today pt reports no pain. Daily BMs, formed (was having dark stool before when pt had a stomach visrus), does have some discomfort, no bleeding for past few days, no episodes of incontinence, and does feel prolapsing tissue (better than last visit). Denies nausea and vomiting. Denies fever and chills. Good appetite. Not taking any anticoagulants. Does feel that RBL has helped. 
As certified below, I, or a nurse practitioner or physician assistant working with me, had a face-to-face encounter that meets the physician face-to-face encounter requirements.

## 2024-09-24 NOTE — DISCHARGE NOTE PROVIDER - NSDCCPTREATMENT_GEN_ALL_CORE_FT
PRINCIPAL PROCEDURE  Procedure: Left heart catheterization  Findings and Treatment: Non-obstructive

## 2024-09-24 NOTE — PROGRESS NOTE ADULT - NS ATTEND AMEND GEN_ALL_CORE FT
Patient seen and examined. Pertinent labs, imaging and telemetry reviewed. I agree with the above:     Patient feeling well.   -IVC 1.7 cm with >50% collapse on 09/22.   -Pt s/p LHC with non-obstructive CAD.   -NICM will need further outpatient work up.     69yoF with asthma and macular degeneration who presented with 2 months of orthopnea and dyspnea. TTE dated 9/20/24 demonstrates LVEF 21%, low normal RV systolic function, severe LAE, severe MR, mild-moderate TR, and PASP 52.1 mmHg. I would like to prioritize diuresis and afterload reduction, with a goal of getting the patient euvolemic by Monday for L/RHC.     Plan:   - Continue Lasix 40 mg IV daily, Toprol 25 mg daily, Entresto 24/26 mg BID, spironolactone 12.5 mg daily   - Add FArxiga today.   -If Cr, K stable, will discharge home tomorrow with outpatient follow up.   - Given AHA and UTD handouts re: HFrEF.
Patient seen and examined. Pertinent labs, imaging and telemetry reviewed. I agree with the above:     Patient feeling well.   -IVC 1.7 cm with >50% collapse.     69yoF with asthma and macular degeneration who presented with 2 months of orthopnea and dyspnea. TTE dated 9/20/24 demonstrates LVEF 21%, low normal RV systolic function, severe LAE, severe MR, mild-moderate TR, and PASP 52.1 mmHg. I would like to prioritize diuresis and afterload reduction, with a goal of getting the patient euvolemic by Monday for L/RHC.     Plan:   - Continue Lasix 40 mg IV daily, Toprol 25 mg daily  and Entresto 24/26 mg BID  - Tomorrow, will consider spironolactone 25 mg daily   - Plan for L/RHC on Monday   - Given AHA and UTD handouts re: HFrEF.
69yoF with asthma and macular degeneration who presented with 2 months of orthopnea and dyspnea. TTE dated 9/20/24 demonstrates LVEF 21%, low normal RV systolic function, severe LAE, severe MR, mild-moderate TR, and PASP 52.1 mmHg. I would like to prioritize diuresis and afterload reduction, with a goal of getting the patient euvolemic by Monday for L/RHC.     Plan:   - Continue Lasix 40 mg IV daily  - Start Entresto 24/26 mg BID  - Tomorrow, will consider spironolactone 25 mg daily   - Once patient is on afterload reduction, will start Toprol 25 mg daily   - Plan for L/RHC on Monday if patient is euvolemic  - Given AHA and UTD handouts re: HFrEF
Patient seen and examined. Pertinent labs, imaging and telemetry reviewed. I agree with the above:     Patient feeling well.   -IVC 1.7 cm with >50% collapse on 09/22.     69yoF with asthma and macular degeneration who presented with 2 months of orthopnea and dyspnea. TTE dated 9/20/24 demonstrates LVEF 21%, low normal RV systolic function, severe LAE, severe MR, mild-moderate TR, and PASP 52.1 mmHg. I would like to prioritize diuresis and afterload reduction, with a goal of getting the patient euvolemic by Monday for L/RHC.     Plan:   - Continue Lasix 40 mg IV daily, Toprol 25 mg daily, Entresto 24/26 mg BID, spironolactone 12.5 mg daily   - Plan for L/RHC Today  - Given AHA and UTD handouts re: HFrEF.
Patient seen and examined. Pertinent labs, imaging and telemetry reviewed. I agree with the above:     Patient feeling well.   -IVC 1.7 cm with >50% collapse.     69yoF with asthma and macular degeneration who presented with 2 months of orthopnea and dyspnea. TTE dated 9/20/24 demonstrates LVEF 21%, low normal RV systolic function, severe LAE, severe MR, mild-moderate TR, and PASP 52.1 mmHg. I would like to prioritize diuresis and afterload reduction, with a goal of getting the patient euvolemic by Monday for L/RHC.     Plan:   - Continue Lasix 40 mg IV daily, Toprol 25 mg daily  and Entresto 24/26 mg BID  - Start spironolactone 12.5 mg daily   - Plan for L/RHC on Monday   - Given AHA and UTD handouts re: HFrEF.

## 2024-09-24 NOTE — DISCHARGE NOTE PROVIDER - HOSPITAL COURSE
Mrs. Cheney is a 69 year old female with PMHx of asthma and macular degeneration who presented to the ED with 2 months of orthopnea and dyspnea. On admission proBNP was 6863 and TTE dated 9/20/24 demonstrates LVEF 21%, low normal RV systolic function, severe LAE, severe MR, mild-moderate TR, and PASP 52.1 mmHg. Patient was getting diuresed with IV lasix, on 9/21/24 IVC was 1.7 cm and > 50% collapsible with improved symptoms. Patient underwent LHC on 9/23/24 which was nonobstructive. Patient has been started on GDMT and has been tolerating medications well.    Patient will be discharged on the following medications:   Toprolol XL 25 mg daily  Entresto 24mg/26 mg BID  Spironolactone 12.5 mg daily   Farxiga 10 mg daily    Patient is hemodynamically stable and ready for discharge today. Mrs. Cheney is a 69 year old female with PMHx of asthma and macular degeneration who presented to the ED with 2 months of orthopnea and dyspnea. On admission proBNP was 6863 and TTE dated 9/20/24 demonstrates LVEF 21%, low normal RV systolic function, severe LAE, severe MR, mild-moderate TR, and PASP 52.1 mmHg. Patient was getting diuresed with IV lasix, on 9/21/24 IVC was 1.7 cm and > 50% collapsible with improved symptoms. Patient underwent LHC on 9/23/24 which was nonobstructive. Patient has been started on GDMT and has been tolerating medications well.    Patient will be discharged on the following medications:   Toprolol XL 25 mg daily  Entresto 24mg/26 mg BID  Spironolactone 12.5 mg daily   Farxiga 10 mg daily  Lasix 20mg PRN if pt notices a 1-2 lb weight gain in a day or 5lbs in a week or signs of swelling     Patient is hemodynamically stable and ready for discharge today. Mrs. Cheney is a 69 year old female with PMHx of asthma and macular degeneration who presented to the ED with 2 months of orthopnea and dyspnea. On admission proBNP was 6863 and TTE dated 9/20/24 demonstrates LVEF 21%, low normal RV systolic function, severe LAE, severe MR, mild-moderate TR, and PASP 52.1 mmHg. Patient was getting diuresed with IV lasix 40mg on 9/21/24 IVC was 1.7 cm and > 50% collapsible with improved symptoms. Patient underwent LHC on 9/23/24 which was nonobstructive. Patient has been started on GDMT and has been tolerating medications well.    Patient will be discharged on the following medications:   Toprolol XL 25 mg daily  Entresto 24mg/26 mg BID  Spironolactone 12.5 mg daily   Farxiga 10 mg daily  Lasix 20mg PRN if pt notices a 1-2 lb weight gain in a day or 5lbs in a week or signs of swelling     Patient is hemodynamically stable and ready for discharge today.     TTE 9/20/24:   1. Moderate to severely increased left ventricular internal cavity size.   2. Severely decreased global left ventricular systolic function.   3. LV Ejection Fraction by Nam's Method with a biplane EF of 21 %.   4. Multiple left ventricular regional wall motion abnormalities exist.   See wall motion findings.   5. The right ventricular size is normal. RV systolic function is low   normal.   6. Severely enlarged left atrium.   7. Severe mitral valve regurgitation.   8. Mild-moderate tricuspid regurgitation.   9. Estimated pulmonary artery systolic pressure is 52.1 mmHg assuming a   right atrial pressure of 8 mmHg, which is consistent with moderate   pulmonary hypertension.  10. Endocardial visualization was enhanced with intravenous echo contrast.    LHC 9/23/24:  FINDINGS:   Coronary Dominance: Left  LM: short segment, no disease  LAD: no disease  CX: no disease  RCA: no disease    LVEDP: 11 mmHg

## 2024-09-24 NOTE — DISCHARGE NOTE PROVIDER - NSDCFUSCHEDAPPT_GEN_ALL_CORE_FT
Yony Woodson  [facility]  PULMMED [facility] Theresa Hernandez  Scheduled Appointment: 10/02/2024

## 2024-09-25 VITALS — TEMPERATURE: 98 F | SYSTOLIC BLOOD PRESSURE: 102 MMHG | DIASTOLIC BLOOD PRESSURE: 62 MMHG | HEART RATE: 78 BPM

## 2024-09-25 LAB
ANION GAP SERPL CALC-SCNC: 11 MMOL/L — SIGNIFICANT CHANGE UP (ref 7–14)
BASOPHILS # BLD AUTO: 0.04 K/UL — SIGNIFICANT CHANGE UP (ref 0–0.2)
BASOPHILS NFR BLD AUTO: 0.8 % — SIGNIFICANT CHANGE UP (ref 0–1)
BUN SERPL-MCNC: 20 MG/DL — SIGNIFICANT CHANGE UP (ref 10–20)
CALCIUM SERPL-MCNC: 9.5 MG/DL — SIGNIFICANT CHANGE UP (ref 8.4–10.4)
CHLORIDE SERPL-SCNC: 108 MMOL/L — SIGNIFICANT CHANGE UP (ref 98–110)
CO2 SERPL-SCNC: 22 MMOL/L — SIGNIFICANT CHANGE UP (ref 17–32)
CREAT SERPL-MCNC: 0.8 MG/DL — SIGNIFICANT CHANGE UP (ref 0.7–1.5)
EGFR: 80 ML/MIN/1.73M2 — SIGNIFICANT CHANGE UP
EOSINOPHIL # BLD AUTO: 0.09 K/UL — SIGNIFICANT CHANGE UP (ref 0–0.7)
EOSINOPHIL NFR BLD AUTO: 1.9 % — SIGNIFICANT CHANGE UP (ref 0–8)
GLUCOSE SERPL-MCNC: 121 MG/DL — HIGH (ref 70–99)
HCT VFR BLD CALC: 42 % — SIGNIFICANT CHANGE UP (ref 37–47)
HGB BLD-MCNC: 13.5 G/DL — SIGNIFICANT CHANGE UP (ref 12–16)
IMM GRANULOCYTES NFR BLD AUTO: 0.2 % — SIGNIFICANT CHANGE UP (ref 0.1–0.3)
LYMPHOCYTES # BLD AUTO: 1.9 K/UL — SIGNIFICANT CHANGE UP (ref 1.2–3.4)
LYMPHOCYTES # BLD AUTO: 40.3 % — SIGNIFICANT CHANGE UP (ref 20.5–51.1)
MAGNESIUM SERPL-MCNC: 2.3 MG/DL — SIGNIFICANT CHANGE UP (ref 1.8–2.4)
MCHC RBC-ENTMCNC: 32.1 G/DL — SIGNIFICANT CHANGE UP (ref 32–37)
MCHC RBC-ENTMCNC: 34.2 PG — HIGH (ref 27–31)
MCV RBC AUTO: 106.3 FL — HIGH (ref 81–99)
MONOCYTES # BLD AUTO: 0.3 K/UL — SIGNIFICANT CHANGE UP (ref 0.1–0.6)
MONOCYTES NFR BLD AUTO: 6.4 % — SIGNIFICANT CHANGE UP (ref 1.7–9.3)
NEUTROPHILS # BLD AUTO: 2.37 K/UL — SIGNIFICANT CHANGE UP (ref 1.4–6.5)
NEUTROPHILS NFR BLD AUTO: 50.4 % — SIGNIFICANT CHANGE UP (ref 42.2–75.2)
NRBC # BLD: 0 /100 WBCS — SIGNIFICANT CHANGE UP (ref 0–0)
PLATELET # BLD AUTO: 212 K/UL — SIGNIFICANT CHANGE UP (ref 130–400)
PMV BLD: 10.2 FL — SIGNIFICANT CHANGE UP (ref 7.4–10.4)
POTASSIUM SERPL-MCNC: 4.5 MMOL/L — SIGNIFICANT CHANGE UP (ref 3.5–5)
POTASSIUM SERPL-SCNC: 4.5 MMOL/L — SIGNIFICANT CHANGE UP (ref 3.5–5)
RBC # BLD: 3.95 M/UL — LOW (ref 4.2–5.4)
RBC # FLD: 17.7 % — HIGH (ref 11.5–14.5)
SODIUM SERPL-SCNC: 141 MMOL/L — SIGNIFICANT CHANGE UP (ref 135–146)
WBC # BLD: 4.71 K/UL — LOW (ref 4.8–10.8)
WBC # FLD AUTO: 4.71 K/UL — LOW (ref 4.8–10.8)

## 2024-09-25 PROCEDURE — 99239 HOSP IP/OBS DSCHRG MGMT >30: CPT

## 2024-09-25 RX ORDER — DAPAGLIFLOZIN 10 MG/1
1 TABLET, FILM COATED ORAL
Qty: 30 | Refills: 0
Start: 2024-09-25 | End: 2024-10-24

## 2024-09-25 RX ORDER — SACUBITRIL AND VALSARTAN 97; 103 MG/1; MG/1
1 TABLET, FILM COATED ORAL
Qty: 60 | Refills: 0
Start: 2024-09-25 | End: 2024-10-24

## 2024-09-25 RX ORDER — SPIRONOLACTONE 100 MG/1
0.5 TABLET, FILM COATED ORAL
Qty: 15 | Refills: 0
Start: 2024-09-25 | End: 2024-10-24

## 2024-09-25 RX ORDER — FUROSEMIDE 10 MG/ML
1 INJECTION INTRAVENOUS
Qty: 30 | Refills: 0
Start: 2024-09-25 | End: 2024-10-24

## 2024-09-25 RX ORDER — METOPROLOL TARTRATE 50 MG
1 TABLET ORAL
Qty: 30 | Refills: 0
Start: 2024-09-25 | End: 2024-10-24

## 2024-09-25 RX ORDER — CHLORHEXIDINE GLUCONATE ORAL RINSE 1.2 MG/ML
1 SOLUTION DENTAL DAILY
Refills: 0 | Status: DISCONTINUED | OUTPATIENT
Start: 2024-09-25 | End: 2024-09-25

## 2024-09-25 RX ADMIN — Medication 25 MILLIGRAM(S): at 05:15

## 2024-09-25 RX ADMIN — DAPAGLIFLOZIN 10 MILLIGRAM(S): 10 TABLET, FILM COATED ORAL at 11:36

## 2024-09-25 RX ADMIN — CHLORHEXIDINE GLUCONATE ORAL RINSE 1 APPLICATION(S): 1.2 SOLUTION DENTAL at 11:39

## 2024-09-25 RX ADMIN — SPIRONOLACTONE 12.5 MILLIGRAM(S): 100 TABLET, FILM COATED ORAL at 05:15

## 2024-09-25 RX ADMIN — SACUBITRIL AND VALSARTAN 1 TABLET(S): 97; 103 TABLET, FILM COATED ORAL at 05:15

## 2024-09-26 ENCOUNTER — NON-APPOINTMENT (OUTPATIENT)
Age: 69
End: 2024-09-26

## 2024-10-02 ENCOUNTER — APPOINTMENT (OUTPATIENT)
Dept: PULMONOLOGY | Facility: CLINIC | Age: 69
End: 2024-10-02
Payer: MEDICARE

## 2024-10-02 VITALS
OXYGEN SATURATION: 97 % | BODY MASS INDEX: 32.96 KG/M2 | RESPIRATION RATE: 14 BRPM | WEIGHT: 186 LBS | DIASTOLIC BLOOD PRESSURE: 62 MMHG | HEART RATE: 70 BPM | SYSTOLIC BLOOD PRESSURE: 98 MMHG | HEIGHT: 63 IN

## 2024-10-02 DIAGNOSIS — J45.909 UNSPECIFIED ASTHMA, UNCOMPLICATED: ICD-10-CM

## 2024-10-02 DIAGNOSIS — G47.10 HYPERSOMNIA, UNSPECIFIED: ICD-10-CM

## 2024-10-02 DIAGNOSIS — G47.30 HYPERSOMNIA, UNSPECIFIED: ICD-10-CM

## 2024-10-02 DIAGNOSIS — E66.9 OBESITY, UNSPECIFIED: ICD-10-CM

## 2024-10-02 PROCEDURE — 99204 OFFICE O/P NEW MOD 45 MIN: CPT

## 2024-10-03 PROBLEM — G47.10 HYPERSOMNIA WITH SLEEP APNEA: Status: ACTIVE | Noted: 2024-10-03

## 2024-10-03 PROBLEM — J45.909 ASTHMA: Status: ACTIVE | Noted: 2024-10-03

## 2024-10-03 PROBLEM — E66.9 OBESITY (BMI 30-39.9): Status: ACTIVE | Noted: 2024-10-03

## 2024-10-03 RX ORDER — FLUTICASONE FUROATE 100 UG/1
100 POWDER RESPIRATORY (INHALATION) DAILY
Qty: 1 | Refills: 5 | Status: ACTIVE | COMMUNITY
Start: 2024-10-03 | End: 1900-01-01

## 2024-10-03 NOTE — ASSESSMENT
[FreeTextEntry1] : Assessment and Plan: - [Name of Diagnosis #1] : CHF (Congestive Heart Failure) Involves the continuation of current medications (Entresto), however, it's crucial to understand its affordability for the patient. - Diagnostic Tests: To investigate further to identify the cause of reduced heart function. - Referrals: Patient to follow up with the cardiologist, Dr. Shanita Love, for further heart function examination and treatment fine-tuning. - Patient Education: Educated patient about the possible factors contributing to her breathing difficulties and heart function.  Diagnosis #2 obstructive sleep apnea.  It is highly likely that the patient has significant obstructive sleep apnea.  I will arrange for the patient to undergo home sleep testing. - Follow-Up appointment after patient consults with the cardiologist and completes sleep study.  - [Name of Diagnosis #2] : Asthma stable not active currently - Therapeutic interventions: Recommending the patient on maintenance inhaler. - Diagnostic Tests: To observe and understand the severity and triggers for the asthma. - Patient Education: Patient is educated about the advantages of the daily use of prescribed inhaler.  - Follow-Up: Future appointments for monitoring the progress and reassessing the treatment plans.

## 2024-10-03 NOTE — PHYSICAL EXAM
[No Acute Distress] : no acute distress [Enlarged Base of the Tongue] : enlarged base of the tongue [IV] : Mallampati Class: IV [Normal Appearance] : normal appearance [No Neck Mass] : no neck mass [Normal Rate/Rhythm] : normal rate/rhythm [Normal S1, S2] : normal s1, s2 [No Murmurs] : no murmurs [No Resp Distress] : no resp distress [Clear to Auscultation Bilaterally] : clear to auscultation bilaterally [No Abnormalities] : no abnormalities [Benign] : benign [Normal Gait] : normal gait [No Cyanosis] : no cyanosis [No Clubbing] : no clubbing [No Edema] : no edema [FROM] : FROM [Normal Color/ Pigmentation] : normal color/ pigmentation [No Focal Deficits] : no focal deficits [Oriented x3] : oriented x3 [Normal Affect] : normal affect

## 2024-10-03 NOTE — HISTORY OF PRESENT ILLNESS
[TextBox_4] : Subjective: - Summary : Patient presents with breathing difficulties that have occurred for some time, having been previously diagnosed with asthma and heart failure. Patient had recent hospitalization, and a cardiology catheterization, which showed no blockages.  - Chief Complaint (CC) : The patient reports difficulties in breathing, which occurs intermittently.  - History of Present Illness : Marita , a 69 year old female, has a history of asthma and also recently diagnosed with heart failure or CHF. The heart failure was identified during a hospital admission due to difficulty breathing and swelling in feet. The patient hyad been treated with prednisone, inhalers, and diuretics for maintaining her health. Marita doesn't have high cholesterol or high blood pressure, but her blood sugar level is slightly high. She's not currently on any inhaler medications. - Past Medical History : Patient has history of frequent bronchitis during childhood and was recently diagnosed with heart failure (CHF), without high cholesterol or high blood pressure. She has slightly high glucose level. - Past Surgical History : Patient has undergone cardiology catheterization where no blockages were identified but she states that her heart function is around 25%.  The cardiologist is performing a workup.

## 2024-10-04 DIAGNOSIS — Z72.89 OTHER PROBLEMS RELATED TO LIFESTYLE: ICD-10-CM

## 2024-10-04 DIAGNOSIS — Z90.710 ACQUIRED ABSENCE OF BOTH CERVIX AND UTERUS: ICD-10-CM

## 2024-10-04 DIAGNOSIS — I50.21 ACUTE SYSTOLIC (CONGESTIVE) HEART FAILURE: ICD-10-CM

## 2024-10-04 DIAGNOSIS — J45.909 UNSPECIFIED ASTHMA, UNCOMPLICATED: ICD-10-CM

## 2024-10-04 DIAGNOSIS — H35.30 UNSPECIFIED MACULAR DEGENERATION: ICD-10-CM

## 2024-10-04 DIAGNOSIS — I25.10 ATHEROSCLEROTIC HEART DISEASE OF NATIVE CORONARY ARTERY WITHOUT ANGINA PECTORIS: ICD-10-CM

## 2024-10-04 DIAGNOSIS — L71.9 ROSACEA, UNSPECIFIED: ICD-10-CM

## 2024-10-04 DIAGNOSIS — I42.8 OTHER CARDIOMYOPATHIES: ICD-10-CM

## 2024-10-04 DIAGNOSIS — Z96.1 PRESENCE OF INTRAOCULAR LENS: ICD-10-CM

## 2024-10-08 DIAGNOSIS — I50.20 UNSPECIFIED SYSTOLIC (CONGESTIVE) HEART FAILURE: ICD-10-CM

## 2024-10-08 RX ORDER — VIT C/E/ZN/COPPR/LUTEIN/ZEAXAN 250MG-90MG
CAPSULE ORAL
Refills: 0 | Status: ACTIVE | COMMUNITY

## 2024-10-08 RX ORDER — FUROSEMIDE 20 MG/1
20 TABLET ORAL
Refills: 0 | Status: ACTIVE | COMMUNITY

## 2024-10-08 RX ORDER — METRONIDAZOLE 7.5 MG/G
0.75 CREAM TOPICAL TWICE DAILY
Refills: 0 | Status: ACTIVE | COMMUNITY

## 2024-10-11 RX ORDER — SPIRONOLACTONE 25 MG/1
25 TABLET ORAL
Qty: 30 | Refills: 0 | Status: ACTIVE | COMMUNITY
Start: 1900-01-01 | End: 1900-01-01

## 2024-10-11 RX ORDER — DAPAGLIFLOZIN 10 MG/1
10 TABLET, FILM COATED ORAL
Qty: 30 | Refills: 0 | Status: ACTIVE | COMMUNITY
Start: 1900-01-01 | End: 1900-01-01

## 2024-10-11 RX ORDER — SACUBITRIL AND VALSARTAN 24; 26 MG/1; MG/1
24-26 TABLET, FILM COATED ORAL TWICE DAILY
Qty: 60 | Refills: 2 | Status: ACTIVE | COMMUNITY
Start: 1900-01-01 | End: 1900-01-01

## 2024-10-11 RX ORDER — METOPROLOL SUCCINATE 25 MG/1
25 TABLET, EXTENDED RELEASE ORAL DAILY
Qty: 30 | Refills: 0 | Status: ACTIVE | COMMUNITY
Start: 1900-01-01 | End: 1900-01-01

## 2024-10-16 ENCOUNTER — APPOINTMENT (OUTPATIENT)
Dept: SLEEP CENTER | Facility: HOSPITAL | Age: 69
End: 2024-10-16

## 2024-10-16 ENCOUNTER — OUTPATIENT (OUTPATIENT)
Dept: OUTPATIENT SERVICES | Facility: HOSPITAL | Age: 69
LOS: 1 days | Discharge: HOME | End: 2024-10-16
Payer: MEDICARE

## 2024-10-16 DIAGNOSIS — G47.33 OBSTRUCTIVE SLEEP APNEA (ADULT) (PEDIATRIC): ICD-10-CM

## 2024-10-16 DIAGNOSIS — Z98.891 HISTORY OF UTERINE SCAR FROM PREVIOUS SURGERY: Chronic | ICD-10-CM

## 2024-10-16 DIAGNOSIS — Z98.890 OTHER SPECIFIED POSTPROCEDURAL STATES: Chronic | ICD-10-CM

## 2024-10-16 DIAGNOSIS — Z90.710 ACQUIRED ABSENCE OF BOTH CERVIX AND UTERUS: Chronic | ICD-10-CM

## 2024-10-16 PROCEDURE — 95800 SLP STDY UNATTENDED: CPT | Mod: 26

## 2024-10-16 PROCEDURE — 95806 SLEEP STUDY UNATT&RESP EFFT: CPT

## 2024-10-19 DIAGNOSIS — G47.33 OBSTRUCTIVE SLEEP APNEA (ADULT) (PEDIATRIC): ICD-10-CM

## 2024-10-22 ENCOUNTER — NON-APPOINTMENT (OUTPATIENT)
Age: 69
End: 2024-10-22

## 2024-10-23 ENCOUNTER — APPOINTMENT (OUTPATIENT)
Dept: CARDIOLOGY | Facility: CLINIC | Age: 69
End: 2024-10-23
Payer: MEDICARE

## 2024-10-23 ENCOUNTER — NON-APPOINTMENT (OUTPATIENT)
Age: 69
End: 2024-10-23

## 2024-10-23 VITALS
HEART RATE: 67 BPM | WEIGHT: 183 LBS | HEIGHT: 63 IN | OXYGEN SATURATION: 99 % | DIASTOLIC BLOOD PRESSURE: 58 MMHG | SYSTOLIC BLOOD PRESSURE: 90 MMHG | BODY MASS INDEX: 32.43 KG/M2

## 2024-10-23 DIAGNOSIS — R73.03 PREDIABETES.: ICD-10-CM

## 2024-10-23 DIAGNOSIS — I34.0 NONRHEUMATIC MITRAL (VALVE) INSUFFICIENCY: ICD-10-CM

## 2024-10-23 PROCEDURE — 99213 OFFICE O/P EST LOW 20 MIN: CPT

## 2024-10-23 RX ORDER — EMPAGLIFLOZIN 10 MG/1
10 TABLET, FILM COATED ORAL DAILY
Qty: 30 | Refills: 6 | Status: ACTIVE | COMMUNITY
Start: 2024-10-23 | End: 1900-01-01

## 2024-10-24 ENCOUNTER — NON-APPOINTMENT (OUTPATIENT)
Age: 69
End: 2024-10-24

## 2024-10-24 DIAGNOSIS — E87.5 HYPERKALEMIA: ICD-10-CM

## 2024-10-27 PROBLEM — E87.5 HYPERKALEMIA: Status: ACTIVE | Noted: 2024-10-27

## 2024-10-27 LAB
ANION GAP SERPL CALC-SCNC: 12 MMOL/L
BUN SERPL-MCNC: 31 MG/DL
CALCIUM SERPL-MCNC: 11.1 MG/DL
CHLORIDE SERPL-SCNC: 100 MMOL/L
CO2 SERPL-SCNC: 24 MMOL/L
CREAT SERPL-MCNC: 1 MG/DL
EGFR: 61 ML/MIN/1.73M2
GLUCOSE SERPL-MCNC: 103 MG/DL
MAGNESIUM SERPL-MCNC: 2.4 MG/DL
POTASSIUM SERPL-SCNC: 5.5 MMOL/L
SODIUM SERPL-SCNC: 136 MMOL/L

## 2024-11-07 ENCOUNTER — OUTPATIENT (OUTPATIENT)
Dept: OUTPATIENT SERVICES | Facility: HOSPITAL | Age: 69
LOS: 1 days | End: 2024-11-07
Payer: MEDICARE

## 2024-11-07 ENCOUNTER — RESULT REVIEW (OUTPATIENT)
Age: 69
End: 2024-11-07

## 2024-11-07 DIAGNOSIS — R92.8 OTHER ABNORMAL AND INCONCLUSIVE FINDINGS ON DIAGNOSTIC IMAGING OF BREAST: ICD-10-CM

## 2024-11-07 DIAGNOSIS — Z12.31 ENCOUNTER FOR SCREENING MAMMOGRAM FOR MALIGNANT NEOPLASM OF BREAST: ICD-10-CM

## 2024-11-07 DIAGNOSIS — Z98.890 OTHER SPECIFIED POSTPROCEDURAL STATES: Chronic | ICD-10-CM

## 2024-11-07 DIAGNOSIS — Z98.891 HISTORY OF UTERINE SCAR FROM PREVIOUS SURGERY: Chronic | ICD-10-CM

## 2024-11-07 PROCEDURE — 77063 BREAST TOMOSYNTHESIS BI: CPT

## 2024-11-07 PROCEDURE — 77063 BREAST TOMOSYNTHESIS BI: CPT | Mod: 26

## 2024-11-07 PROCEDURE — 77067 SCR MAMMO BI INCL CAD: CPT

## 2024-11-07 PROCEDURE — 77067 SCR MAMMO BI INCL CAD: CPT | Mod: 26

## 2024-11-08 DIAGNOSIS — Z12.31 ENCOUNTER FOR SCREENING MAMMOGRAM FOR MALIGNANT NEOPLASM OF BREAST: ICD-10-CM

## 2024-11-14 ENCOUNTER — RESULT REVIEW (OUTPATIENT)
Age: 69
End: 2024-11-14

## 2024-11-14 ENCOUNTER — OUTPATIENT (OUTPATIENT)
Dept: OUTPATIENT SERVICES | Facility: HOSPITAL | Age: 69
LOS: 1 days | End: 2024-11-14
Payer: MEDICARE

## 2024-11-14 DIAGNOSIS — Z98.890 OTHER SPECIFIED POSTPROCEDURAL STATES: Chronic | ICD-10-CM

## 2024-11-14 DIAGNOSIS — R92.8 OTHER ABNORMAL AND INCONCLUSIVE FINDINGS ON DIAGNOSTIC IMAGING OF BREAST: ICD-10-CM

## 2024-11-14 DIAGNOSIS — Z98.891 HISTORY OF UTERINE SCAR FROM PREVIOUS SURGERY: Chronic | ICD-10-CM

## 2024-11-14 DIAGNOSIS — Z90.710 ACQUIRED ABSENCE OF BOTH CERVIX AND UTERUS: Chronic | ICD-10-CM

## 2024-11-14 PROCEDURE — 76642 ULTRASOUND BREAST LIMITED: CPT | Mod: 26,LT

## 2024-11-14 PROCEDURE — 77065 DX MAMMO INCL CAD UNI: CPT | Mod: 26,LT

## 2024-11-14 PROCEDURE — G0279: CPT | Mod: 26

## 2024-11-14 PROCEDURE — 76642 ULTRASOUND BREAST LIMITED: CPT | Mod: LT

## 2024-11-14 PROCEDURE — 77065 DX MAMMO INCL CAD UNI: CPT | Mod: LT

## 2024-11-14 PROCEDURE — G0279: CPT

## 2024-11-15 DIAGNOSIS — R92.8 OTHER ABNORMAL AND INCONCLUSIVE FINDINGS ON DIAGNOSTIC IMAGING OF BREAST: ICD-10-CM

## 2024-12-11 ENCOUNTER — APPOINTMENT (OUTPATIENT)
Dept: PULMONOLOGY | Facility: CLINIC | Age: 69
End: 2024-12-11
Payer: MEDICARE

## 2024-12-11 VITALS
HEART RATE: 100 BPM | WEIGHT: 183 LBS | OXYGEN SATURATION: 98 % | HEIGHT: 63 IN | BODY MASS INDEX: 32.43 KG/M2 | DIASTOLIC BLOOD PRESSURE: 68 MMHG | SYSTOLIC BLOOD PRESSURE: 100 MMHG

## 2024-12-11 DIAGNOSIS — E66.9 OBESITY, UNSPECIFIED: ICD-10-CM

## 2024-12-11 DIAGNOSIS — J45.909 UNSPECIFIED ASTHMA, UNCOMPLICATED: ICD-10-CM

## 2024-12-11 DIAGNOSIS — G47.33 OBSTRUCTIVE SLEEP APNEA (ADULT) (PEDIATRIC): ICD-10-CM

## 2024-12-11 PROCEDURE — G2211 COMPLEX E/M VISIT ADD ON: CPT

## 2024-12-11 PROCEDURE — 99213 OFFICE O/P EST LOW 20 MIN: CPT

## 2024-12-16 ENCOUNTER — RX RENEWAL (OUTPATIENT)
Age: 69
End: 2024-12-16

## 2025-01-08 ENCOUNTER — APPOINTMENT (OUTPATIENT)
Dept: CARDIOLOGY | Facility: CLINIC | Age: 70
End: 2025-01-08
Payer: MEDICARE

## 2025-01-08 PROCEDURE — 93306 TTE W/DOPPLER COMPLETE: CPT

## 2025-01-15 ENCOUNTER — APPOINTMENT (OUTPATIENT)
Dept: CARDIOLOGY | Facility: CLINIC | Age: 70
End: 2025-01-15
Payer: MEDICARE

## 2025-01-15 ENCOUNTER — NON-APPOINTMENT (OUTPATIENT)
Age: 70
End: 2025-01-15

## 2025-01-15 VITALS
WEIGHT: 183 LBS | HEART RATE: 58 BPM | OXYGEN SATURATION: 98 % | BODY MASS INDEX: 32.43 KG/M2 | SYSTOLIC BLOOD PRESSURE: 118 MMHG | HEIGHT: 63 IN | DIASTOLIC BLOOD PRESSURE: 60 MMHG

## 2025-01-15 DIAGNOSIS — I44.7 LEFT BUNDLE-BRANCH BLOCK, UNSPECIFIED: ICD-10-CM

## 2025-01-15 PROCEDURE — 99214 OFFICE O/P EST MOD 30 MIN: CPT

## 2025-01-15 PROCEDURE — 93000 ELECTROCARDIOGRAM COMPLETE: CPT

## 2025-01-16 RX ORDER — EMPAGLIFLOZIN 10 MG/1
10 TABLET, FILM COATED ORAL
Qty: 90 | Refills: 3 | Status: ACTIVE | COMMUNITY
Start: 2025-01-16 | End: 1900-01-01

## 2025-01-16 RX ORDER — DAPAGLIFLOZIN 10 MG/1
10 TABLET, FILM COATED ORAL
Qty: 90 | Refills: 3 | Status: DISCONTINUED | COMMUNITY
Start: 2025-01-15 | End: 2025-01-16

## 2025-01-30 ENCOUNTER — APPOINTMENT (OUTPATIENT)
Facility: CLINIC | Age: 70
End: 2025-01-30
Payer: MEDICARE

## 2025-01-30 ENCOUNTER — NON-APPOINTMENT (OUTPATIENT)
Age: 70
End: 2025-01-30

## 2025-01-30 PROCEDURE — 99214 OFFICE O/P EST MOD 30 MIN: CPT

## 2025-01-30 PROCEDURE — 99204 OFFICE O/P NEW MOD 45 MIN: CPT

## 2025-01-30 PROCEDURE — 76512 OPH US DX B-SCAN: CPT | Mod: RT

## 2025-01-30 PROCEDURE — 92250 FUNDUS PHOTOGRAPHY W/I&R: CPT

## 2025-02-18 ENCOUNTER — NON-APPOINTMENT (OUTPATIENT)
Age: 70
End: 2025-02-18

## 2025-02-18 ENCOUNTER — APPOINTMENT (OUTPATIENT)
Dept: ELECTROPHYSIOLOGY | Facility: CLINIC | Age: 70
End: 2025-02-18
Payer: MEDICARE

## 2025-02-18 VITALS
DIASTOLIC BLOOD PRESSURE: 54 MMHG | BODY MASS INDEX: 32.6 KG/M2 | WEIGHT: 184 LBS | HEIGHT: 63 IN | SYSTOLIC BLOOD PRESSURE: 118 MMHG | HEART RATE: 61 BPM

## 2025-02-18 DIAGNOSIS — I50.20 UNSPECIFIED SYSTOLIC (CONGESTIVE) HEART FAILURE: ICD-10-CM

## 2025-02-18 DIAGNOSIS — Z78.9 OTHER SPECIFIED HEALTH STATUS: ICD-10-CM

## 2025-02-18 DIAGNOSIS — I44.7 LEFT BUNDLE-BRANCH BLOCK, UNSPECIFIED: ICD-10-CM

## 2025-02-18 DIAGNOSIS — I42.8 OTHER CARDIOMYOPATHIES: ICD-10-CM

## 2025-02-18 PROCEDURE — 99205 OFFICE O/P NEW HI 60 MIN: CPT

## 2025-02-18 PROCEDURE — 93000 ELECTROCARDIOGRAM COMPLETE: CPT

## 2025-02-24 ENCOUNTER — TRANSCRIPTION ENCOUNTER (OUTPATIENT)
Age: 70
End: 2025-02-24

## 2025-03-03 ENCOUNTER — APPOINTMENT (OUTPATIENT)
Facility: CLINIC | Age: 70
End: 2025-03-03
Payer: MEDICARE

## 2025-03-03 ENCOUNTER — NON-APPOINTMENT (OUTPATIENT)
Age: 70
End: 2025-03-03

## 2025-03-03 PROCEDURE — 99214 OFFICE O/P EST MOD 30 MIN: CPT

## 2025-03-05 ENCOUNTER — NON-APPOINTMENT (OUTPATIENT)
Age: 70
End: 2025-03-05

## 2025-03-05 ENCOUNTER — OUTPATIENT (OUTPATIENT)
Dept: OUTPATIENT SERVICES | Facility: HOSPITAL | Age: 70
LOS: 1 days | End: 2025-03-05
Payer: MEDICARE

## 2025-03-05 VITALS
OXYGEN SATURATION: 97 % | RESPIRATION RATE: 16 BRPM | HEART RATE: 54 BPM | HEIGHT: 63 IN | TEMPERATURE: 98 F | WEIGHT: 184.97 LBS | SYSTOLIC BLOOD PRESSURE: 114 MMHG | DIASTOLIC BLOOD PRESSURE: 69 MMHG

## 2025-03-05 DIAGNOSIS — Z98.42 CATARACT EXTRACTION STATUS, LEFT EYE: Chronic | ICD-10-CM

## 2025-03-05 DIAGNOSIS — Z01.818 ENCOUNTER FOR OTHER PREPROCEDURAL EXAMINATION: ICD-10-CM

## 2025-03-05 DIAGNOSIS — Z98.890 OTHER SPECIFIED POSTPROCEDURAL STATES: Chronic | ICD-10-CM

## 2025-03-05 DIAGNOSIS — I50.22 CHRONIC SYSTOLIC (CONGESTIVE) HEART FAILURE: ICD-10-CM

## 2025-03-05 DIAGNOSIS — Z98.41 CATARACT EXTRACTION STATUS, RIGHT EYE: Chronic | ICD-10-CM

## 2025-03-05 DIAGNOSIS — Z98.891 HISTORY OF UTERINE SCAR FROM PREVIOUS SURGERY: Chronic | ICD-10-CM

## 2025-03-05 DIAGNOSIS — Z90.710 ACQUIRED ABSENCE OF BOTH CERVIX AND UTERUS: Chronic | ICD-10-CM

## 2025-03-05 LAB
ALBUMIN SERPL ELPH-MCNC: 4.3 G/DL — SIGNIFICANT CHANGE UP (ref 3.5–5.2)
ALP SERPL-CCNC: 111 U/L — SIGNIFICANT CHANGE UP (ref 30–115)
ALT FLD-CCNC: 20 U/L — SIGNIFICANT CHANGE UP (ref 0–41)
ANION GAP SERPL CALC-SCNC: 11 MMOL/L — SIGNIFICANT CHANGE UP (ref 7–14)
APPEARANCE UR: CLEAR — SIGNIFICANT CHANGE UP
APTT BLD: 35 SEC — SIGNIFICANT CHANGE UP (ref 27–39.2)
AST SERPL-CCNC: 21 U/L — SIGNIFICANT CHANGE UP (ref 0–41)
BACTERIA # UR AUTO: NEGATIVE /HPF — SIGNIFICANT CHANGE UP
BASOPHILS # BLD AUTO: 0.04 K/UL — SIGNIFICANT CHANGE UP (ref 0–0.2)
BASOPHILS NFR BLD AUTO: 0.6 % — SIGNIFICANT CHANGE UP (ref 0–1)
BILIRUB SERPL-MCNC: 0.4 MG/DL — SIGNIFICANT CHANGE UP (ref 0.2–1.2)
BILIRUB UR-MCNC: NEGATIVE — SIGNIFICANT CHANGE UP
BLD GP AB SCN SERPL QL: SIGNIFICANT CHANGE UP
BUN SERPL-MCNC: 21 MG/DL — HIGH (ref 10–20)
CALCIUM SERPL-MCNC: 10.4 MG/DL — SIGNIFICANT CHANGE UP (ref 8.4–10.5)
CAST: 0 /LPF — SIGNIFICANT CHANGE UP (ref 0–4)
CHLORIDE SERPL-SCNC: 102 MMOL/L — SIGNIFICANT CHANGE UP (ref 98–110)
CO2 SERPL-SCNC: 26 MMOL/L — SIGNIFICANT CHANGE UP (ref 17–32)
COLOR SPEC: YELLOW — SIGNIFICANT CHANGE UP
CREAT SERPL-MCNC: 0.8 MG/DL — SIGNIFICANT CHANGE UP (ref 0.7–1.5)
DIFF PNL FLD: ABNORMAL
EGFR: 80 ML/MIN/1.73M2 — SIGNIFICANT CHANGE UP
EGFR: 80 ML/MIN/1.73M2 — SIGNIFICANT CHANGE UP
EOSINOPHIL # BLD AUTO: 0.03 K/UL — SIGNIFICANT CHANGE UP (ref 0–0.7)
EOSINOPHIL NFR BLD AUTO: 0.5 % — SIGNIFICANT CHANGE UP (ref 0–8)
GLUCOSE SERPL-MCNC: 100 MG/DL — HIGH (ref 70–99)
GLUCOSE UR QL: >=1000 MG/DL
HCT VFR BLD CALC: 47 % — SIGNIFICANT CHANGE UP (ref 37–47)
HGB BLD-MCNC: 15.3 G/DL — SIGNIFICANT CHANGE UP (ref 12–16)
IMM GRANULOCYTES NFR BLD AUTO: 0.5 % — HIGH (ref 0.1–0.3)
INR BLD: 0.97 RATIO — SIGNIFICANT CHANGE UP (ref 0.65–1.3)
KETONES UR-MCNC: ABNORMAL MG/DL
LEUKOCYTE ESTERASE UR-ACNC: NEGATIVE — SIGNIFICANT CHANGE UP
LYMPHOCYTES # BLD AUTO: 1.21 K/UL — SIGNIFICANT CHANGE UP (ref 1.2–3.4)
LYMPHOCYTES # BLD AUTO: 19.6 % — LOW (ref 20.5–51.1)
MCHC RBC-ENTMCNC: 32.6 G/DL — SIGNIFICANT CHANGE UP (ref 32–37)
MCHC RBC-ENTMCNC: 33.8 PG — HIGH (ref 27–31)
MCV RBC AUTO: 104 FL — HIGH (ref 81–99)
MONOCYTES # BLD AUTO: 0.3 K/UL — SIGNIFICANT CHANGE UP (ref 0.1–0.6)
MONOCYTES NFR BLD AUTO: 4.9 % — SIGNIFICANT CHANGE UP (ref 1.7–9.3)
MRSA PCR RESULT.: NEGATIVE — SIGNIFICANT CHANGE UP
NEUTROPHILS # BLD AUTO: 4.57 K/UL — SIGNIFICANT CHANGE UP (ref 1.4–6.5)
NEUTROPHILS NFR BLD AUTO: 73.9 % — SIGNIFICANT CHANGE UP (ref 42.2–75.2)
NITRITE UR-MCNC: NEGATIVE — SIGNIFICANT CHANGE UP
NRBC BLD AUTO-RTO: 0 /100 WBCS — SIGNIFICANT CHANGE UP (ref 0–0)
PH UR: 5.5 — SIGNIFICANT CHANGE UP (ref 5–8)
PLATELET # BLD AUTO: 226 K/UL — SIGNIFICANT CHANGE UP (ref 130–400)
PMV BLD: 9.6 FL — SIGNIFICANT CHANGE UP (ref 7.4–10.4)
POTASSIUM SERPL-MCNC: 4.5 MMOL/L — SIGNIFICANT CHANGE UP (ref 3.5–5)
POTASSIUM SERPL-SCNC: 4.5 MMOL/L — SIGNIFICANT CHANGE UP (ref 3.5–5)
PROT SERPL-MCNC: 7.5 G/DL — SIGNIFICANT CHANGE UP (ref 6–8)
PROT UR-MCNC: NEGATIVE MG/DL — SIGNIFICANT CHANGE UP
PROTHROM AB SERPL-ACNC: 11.4 SEC — SIGNIFICANT CHANGE UP (ref 9.95–12.87)
RBC # BLD: 4.52 M/UL — SIGNIFICANT CHANGE UP (ref 4.2–5.4)
RBC # FLD: 12.4 % — SIGNIFICANT CHANGE UP (ref 11.5–14.5)
RBC CASTS # UR COMP ASSIST: 1 /HPF — SIGNIFICANT CHANGE UP (ref 0–4)
SODIUM SERPL-SCNC: 139 MMOL/L — SIGNIFICANT CHANGE UP (ref 135–146)
SP GR SPEC: 1.03 — SIGNIFICANT CHANGE UP (ref 1–1.03)
SQUAMOUS # UR AUTO: 1 /HPF — SIGNIFICANT CHANGE UP (ref 0–5)
UROBILINOGEN FLD QL: 0.2 MG/DL — SIGNIFICANT CHANGE UP (ref 0.2–1)
WBC # BLD: 6.18 K/UL — SIGNIFICANT CHANGE UP (ref 4.8–10.8)
WBC # FLD AUTO: 6.18 K/UL — SIGNIFICANT CHANGE UP (ref 4.8–10.8)
WBC UR QL: 0 /HPF — SIGNIFICANT CHANGE UP (ref 0–5)

## 2025-03-05 PROCEDURE — 85610 PROTHROMBIN TIME: CPT

## 2025-03-05 PROCEDURE — 93010 ELECTROCARDIOGRAM REPORT: CPT

## 2025-03-05 PROCEDURE — 87086 URINE CULTURE/COLONY COUNT: CPT

## 2025-03-05 PROCEDURE — 81001 URINALYSIS AUTO W/SCOPE: CPT

## 2025-03-05 PROCEDURE — 86900 BLOOD TYPING SEROLOGIC ABO: CPT

## 2025-03-05 PROCEDURE — 87077 CULTURE AEROBIC IDENTIFY: CPT

## 2025-03-05 PROCEDURE — 80053 COMPREHEN METABOLIC PANEL: CPT

## 2025-03-05 PROCEDURE — 86850 RBC ANTIBODY SCREEN: CPT

## 2025-03-05 PROCEDURE — 99214 OFFICE O/P EST MOD 30 MIN: CPT | Mod: 25

## 2025-03-05 PROCEDURE — 86901 BLOOD TYPING SEROLOGIC RH(D): CPT

## 2025-03-05 PROCEDURE — 85730 THROMBOPLASTIN TIME PARTIAL: CPT

## 2025-03-05 PROCEDURE — 87186 SC STD MICRODIL/AGAR DIL: CPT

## 2025-03-05 PROCEDURE — 87640 STAPH A DNA AMP PROBE: CPT

## 2025-03-05 PROCEDURE — 87641 MR-STAPH DNA AMP PROBE: CPT

## 2025-03-05 PROCEDURE — 93005 ELECTROCARDIOGRAM TRACING: CPT

## 2025-03-05 PROCEDURE — 85025 COMPLETE CBC W/AUTO DIFF WBC: CPT

## 2025-03-05 PROCEDURE — 36415 COLL VENOUS BLD VENIPUNCTURE: CPT

## 2025-03-05 NOTE — H&P PST ADULT - NSICDXPASTMEDICALHX_GEN_ALL_CORE_FT
PAST MEDICAL HISTORY:  Chronic systolic congestive heart failure     Macular degeneration     Mild asthma     Non-ischemic cardiomyopathy     AYLEEN on CPAP     Pre-diabetes     Rosacea

## 2025-03-05 NOTE — H&P PST ADULT - HISTORY OF PRESENT ILLNESS
PATIENT DENIES CHEST PAIN, SHORTNESS OF BREATH, PALPITATIONS, COUGHING, FEVER, DYSURIA.    NO COUGH, FEVER, SORE THROAT, HEADACHE, LOSS OF TASTE OR SMELL. NO KNOWN EXPOSURE TO ANYONE WITH COVID. PATIENT WAS INSTRUCTED TO ISOLATE FROM NOW UNTIL THE SURGERY.    Anesthesia Alert  NO--Difficult Airway  NO--History of neck surgery or radiation  NO--Limited ROM of neck  NO--History of Malignant hyperthermia  NO--Personal or family history of Pseudocholinesterase deficiency  NO--Prior Anesthesia Complication  NO--Latex Allergy  NO--Loose teeth  NO--History of Rheumatoid Arthritis  + AYLEEN (CPAP)  NO--BLEEDING RISK    RCRI=1  DASI= 7.23 METS

## 2025-03-05 NOTE — H&P PST ADULT - REASON FOR ADMISSION
70 y/o female here for PAST. She states that in September her right foot swelled up and she was SOB. She couldn't lay down flat. She went to the ER and they did a echo. EF was 21%. She was sent to Dr. Lafleur. Patient has a h/o Prediabetes, AYLEEN on CPAP, asthma, macular degeneration, normal coronaries in 09/2024, non-ischemic cardiomyopathy, chronic systolic heart failure Stage C, NYHA class III, left bundle branch block with QRS of 158 msecs. Patient was diagnosed with chronic systolic heart failure and non-ischemic cardiomyopathy in 09/2024. She was started on guideline directed medical therapy. She is compliant with GDMT. Repeat echo in 01/2025 showed EF of 31% despite GDMT. Patient has no chest pain, no shortness of breath at rest, no lightheadedness, no dizziness, and no syncope. Patient has moderate dyspnea on exertion.   Now for scheduled ICD BIV IMPLANT.

## 2025-03-05 NOTE — H&P PST ADULT - NS PRO FEM  PAP SMEARS 3YRS
December 31, 2021     Patient: Aung Mendoza   YOB: 1962   Date of Visit: 12/31/2021       To Whom it May Concern:    Aung Mendoza was seen on 12/31/2021 at 8:15 am.    Aung has an appointment with Dr Kearns, neurosurgery, on 1/18/22. He will be evaluated for work status at that visit.     Sincerely,         Zenaida S Salal, DO    Medical information is confidential and cannot be disclosed without the written consent of the patient or his representative.      
yes

## 2025-03-05 NOTE — H&P PST ADULT - NSICDXPASTSURGICALHX_GEN_ALL_CORE_FT
PAST SURGICAL HISTORY:  Cataract extraction status of right eye     Cataract extraction status, left eye     H/O total hysterectomy     History of      History of cardiac catheterization

## 2025-03-06 DIAGNOSIS — Z01.818 ENCOUNTER FOR OTHER PREPROCEDURAL EXAMINATION: ICD-10-CM

## 2025-03-06 DIAGNOSIS — I50.22 CHRONIC SYSTOLIC (CONGESTIVE) HEART FAILURE: ICD-10-CM

## 2025-03-07 LAB
-  AMPICILLIN: SIGNIFICANT CHANGE UP
-  CIPROFLOXACIN: SIGNIFICANT CHANGE UP
-  LEVOFLOXACIN: SIGNIFICANT CHANGE UP
-  NITROFURANTOIN: SIGNIFICANT CHANGE UP
-  TETRACYCLINE: SIGNIFICANT CHANGE UP
-  VANCOMYCIN: SIGNIFICANT CHANGE UP
CULTURE RESULTS: ABNORMAL
METHOD TYPE: SIGNIFICANT CHANGE UP
ORGANISM # SPEC MICROSCOPIC CNT: ABNORMAL
ORGANISM # SPEC MICROSCOPIC CNT: SIGNIFICANT CHANGE UP
SPECIMEN SOURCE: SIGNIFICANT CHANGE UP

## 2025-03-11 DIAGNOSIS — N39.0 URINARY TRACT INFECTION, SITE NOT SPECIFIED: ICD-10-CM

## 2025-03-11 RX ORDER — NITROFURANTOIN (MONOHYDRATE/MACROCRYSTALS) 25; 75 MG/1; MG/1
100 CAPSULE ORAL
Qty: 10 | Refills: 0 | Status: ACTIVE | COMMUNITY
Start: 2025-03-11 | End: 1900-01-01

## 2025-03-19 ENCOUNTER — TRANSCRIPTION ENCOUNTER (OUTPATIENT)
Age: 70
End: 2025-03-19

## 2025-03-19 ENCOUNTER — INPATIENT (INPATIENT)
Facility: HOSPITAL | Age: 70
LOS: 0 days | Discharge: HOME | DRG: 315 | End: 2025-03-20
Attending: STUDENT IN AN ORGANIZED HEALTH CARE EDUCATION/TRAINING PROGRAM | Admitting: STUDENT IN AN ORGANIZED HEALTH CARE EDUCATION/TRAINING PROGRAM
Payer: MEDICARE

## 2025-03-19 ENCOUNTER — APPOINTMENT (OUTPATIENT)
Dept: ELECTROPHYSIOLOGY | Facility: HOSPITAL | Age: 70
End: 2025-03-19

## 2025-03-19 VITALS
DIASTOLIC BLOOD PRESSURE: 47 MMHG | TEMPERATURE: 98 F | HEART RATE: 59 BPM | RESPIRATION RATE: 14 BRPM | SYSTOLIC BLOOD PRESSURE: 133 MMHG | WEIGHT: 185.19 LBS | HEIGHT: 62.99 IN | OXYGEN SATURATION: 99 %

## 2025-03-19 DIAGNOSIS — Z90.710 ACQUIRED ABSENCE OF BOTH CERVIX AND UTERUS: Chronic | ICD-10-CM

## 2025-03-19 DIAGNOSIS — I50.22 CHRONIC SYSTOLIC (CONGESTIVE) HEART FAILURE: ICD-10-CM

## 2025-03-19 DIAGNOSIS — Z98.890 OTHER SPECIFIED POSTPROCEDURAL STATES: Chronic | ICD-10-CM

## 2025-03-19 DIAGNOSIS — Z98.41 CATARACT EXTRACTION STATUS, RIGHT EYE: Chronic | ICD-10-CM

## 2025-03-19 DIAGNOSIS — Z98.42 CATARACT EXTRACTION STATUS, LEFT EYE: Chronic | ICD-10-CM

## 2025-03-19 DIAGNOSIS — Z98.891 HISTORY OF UTERINE SCAR FROM PREVIOUS SURGERY: Chronic | ICD-10-CM

## 2025-03-19 PROCEDURE — 36415 COLL VENOUS BLD VENIPUNCTURE: CPT

## 2025-03-19 PROCEDURE — C1889: CPT

## 2025-03-19 PROCEDURE — 33225 L VENTRIC PACING LEAD ADD-ON: CPT

## 2025-03-19 PROCEDURE — 33249 INSJ/RPLCMT DEFIB W/LEAD(S): CPT | Mod: KX

## 2025-03-19 PROCEDURE — C1900: CPT

## 2025-03-19 PROCEDURE — 86900 BLOOD TYPING SEROLOGIC ABO: CPT

## 2025-03-19 PROCEDURE — 71045 X-RAY EXAM CHEST 1 VIEW: CPT | Mod: 26

## 2025-03-19 PROCEDURE — 93005 ELECTROCARDIOGRAM TRACING: CPT | Mod: XU

## 2025-03-19 PROCEDURE — 93010 ELECTROCARDIOGRAM REPORT: CPT | Mod: 76

## 2025-03-19 PROCEDURE — C1892: CPT

## 2025-03-19 PROCEDURE — C1882: CPT

## 2025-03-19 PROCEDURE — 71046 X-RAY EXAM CHEST 2 VIEWS: CPT | Mod: Z9

## 2025-03-19 PROCEDURE — 86901 BLOOD TYPING SEROLOGIC RH(D): CPT

## 2025-03-19 PROCEDURE — C1898: CPT

## 2025-03-19 PROCEDURE — 71045 X-RAY EXAM CHEST 1 VIEW: CPT

## 2025-03-19 PROCEDURE — 93284 PRGRMG EVAL IMPLANTABLE DFB: CPT

## 2025-03-19 PROCEDURE — 33249 INSJ/RPLCMT DEFIB W/LEAD(S): CPT

## 2025-03-19 PROCEDURE — 86850 RBC ANTIBODY SCREEN: CPT

## 2025-03-19 PROCEDURE — C1769: CPT

## 2025-03-19 RX ORDER — SPIRONOLACTONE 25 MG
12.5 TABLET ORAL DAILY
Refills: 0 | Status: DISCONTINUED | OUTPATIENT
Start: 2025-03-19 | End: 2025-03-20

## 2025-03-19 RX ORDER — VANCOMYCIN HCL IN 5 % DEXTROSE 1.5G/250ML
1000 PLASTIC BAG, INJECTION (ML) INTRAVENOUS ONCE
Refills: 0 | Status: COMPLETED | OUTPATIENT
Start: 2025-03-20 | End: 2025-03-20

## 2025-03-19 RX ORDER — DAPAGLIFLOZIN 5 MG/1
10 TABLET, FILM COATED ORAL DAILY
Refills: 0 | Status: DISCONTINUED | OUTPATIENT
Start: 2025-03-19 | End: 2025-03-20

## 2025-03-19 RX ORDER — HYDROMORPHONE/SOD CHLOR,ISO/PF 2 MG/10 ML
0.5 SYRINGE (ML) INJECTION
Refills: 0 | Status: DISCONTINUED | OUTPATIENT
Start: 2025-03-19 | End: 2025-03-20

## 2025-03-19 RX ORDER — VANCOMYCIN HCL IN 5 % DEXTROSE 1.5G/250ML
1000 PLASTIC BAG, INJECTION (ML) INTRAVENOUS ONCE
Refills: 0 | Status: COMPLETED | OUTPATIENT
Start: 2025-03-19 | End: 2025-03-19

## 2025-03-19 RX ORDER — CEFAZOLIN SODIUM IN 0.9 % NACL 3 G/100 ML
1000 INTRAVENOUS SOLUTION, PIGGYBACK (ML) INTRAVENOUS ONCE
Refills: 0 | Status: DISCONTINUED | OUTPATIENT
Start: 2025-03-19 | End: 2025-03-19

## 2025-03-19 RX ORDER — METOPROLOL SUCCINATE 50 MG/1
25 TABLET, EXTENDED RELEASE ORAL DAILY
Refills: 0 | Status: DISCONTINUED | OUTPATIENT
Start: 2025-03-19 | End: 2025-03-20

## 2025-03-19 RX ORDER — SACUBITRIL AND VALSARTAN 49; 51 MG/1; MG/1
1 TABLET, FILM COATED ORAL
Refills: 0 | Status: DISCONTINUED | OUTPATIENT
Start: 2025-03-19 | End: 2025-03-20

## 2025-03-19 RX ORDER — ACETAMINOPHEN 500 MG/5ML
1000 LIQUID (ML) ORAL ONCE
Refills: 0 | Status: COMPLETED | OUTPATIENT
Start: 2025-03-19 | End: 2025-03-19

## 2025-03-19 RX ORDER — CEFAZOLIN SODIUM IN 0.9 % NACL 3 G/100 ML
2000 INTRAVENOUS SOLUTION, PIGGYBACK (ML) INTRAVENOUS ONCE
Refills: 0 | Status: DISCONTINUED | OUTPATIENT
Start: 2025-03-19 | End: 2025-03-19

## 2025-03-19 RX ORDER — FUROSEMIDE 10 MG/ML
20 INJECTION INTRAMUSCULAR; INTRAVENOUS DAILY
Refills: 0 | Status: DISCONTINUED | OUTPATIENT
Start: 2025-03-19 | End: 2025-03-20

## 2025-03-19 RX ORDER — EMPAGLIFLOZIN 25 MG/1
1 TABLET, FILM COATED ORAL
Refills: 0 | DISCHARGE

## 2025-03-19 RX ADMIN — Medication 400 MILLIGRAM(S): at 19:15

## 2025-03-19 RX ADMIN — Medication 250 MILLIGRAM(S): at 13:20

## 2025-03-19 RX ADMIN — Medication 250 MILLIGRAM(S): at 17:40

## 2025-03-19 NOTE — PRE-ANESTHESIA EVALUATION ADULT - NSRADCARDRESULTSFT_GEN_ALL_CORE
TTE 9/20/24  Summary:   1. Moderate to severely increased left ventricular internal cavity size.   2. Severely decreased global left ventricular systolic function.   3. LV Ejection Fraction by Nam's Method with a biplane EF of 21 %.   4. Multiple left ventricular regional wall motion abnormalities exist.   See wall motion findings.   5. The right ventricular size is normal. RV systolic function is low   normal.   6. Severely enlarged left atrium.   7. Severe mitral valve regurgitation.   8. Mild-moderate tricuspid regurgitation.   9. Estimated pulmonary artery systolic pressure is 52.1 mmHg assuming a   right atrial pressure of 8 mmHg, which is consistent with moderate   pulmonary hypertension.  10. Endocardial visualization was enhanced with intravenous echo contrast.

## 2025-03-19 NOTE — PATIENT PROFILE ADULT - SURGICAL SITE INCISION
Called and spoke to pt. She will go for labs next week. Please place order  
Please let patient know that the endocrinologist will not make an appointment and less we can present more concrete evidence that there is an endocrine disorder.    I recommend that she come by the [facility] lab and we will do more lab tests to see if we come up with something.  If we do then that would get us in to the endocrine office.    Let me know if she is agreeable to that.    Please let Jeimy know that we are working on this.  
yes
IV discontinued, cath removed intact

## 2025-03-19 NOTE — DISCHARGE NOTE PROVIDER - ATTENDING DISCHARGE PHYSICAL EXAMINATION:
Patient seen and examined. Pertinent labs, imaging and telemetry reviewed. I agree with the above:     Patient feeling well.  RRR. S1S2 present  CTA B/L   Left upper chest wall site CDI without hematoma.     Patient is stable for discharge home with outpatient follow up.

## 2025-03-19 NOTE — DISCHARGE NOTE PROVIDER - EXTENDED VTE YES NO FOR MLM ENOXAPARIN
Called patient and discussed the urinary tract infection and her antibiotic allergies.  Has hives to penicilliln.  Macrobid has been tried x 2.  Discussed options - patient would like to try fosfomycin and if cost prohibitive, will try a fluoroquinolone.  Recommended immediate medical evaluation with any fever, severe back pain, vomiting. Patient expressed understanding.    Patient is worried because her friend's mother had ovarian cancer and had symptoms of multiple urinary tract infections; patient thinks she only has one ovary left.  Reviewed that ovarian cancer may have vague symptoms like bloating, pelvic pain, early satiety. Patient denies those symptoms.  Encouraged follow up if they were to recur.  She expressed understanding and had no further questions.  
,

## 2025-03-19 NOTE — PATIENT PROFILE ADULT - FALL HARM RISK - HARM RISK INTERVENTIONS

## 2025-03-19 NOTE — DISCHARGE NOTE PROVIDER - HOSPITAL COURSE
Patient is a 69y Female with PMHx of pre-DM, AYLEEN on Cpap, asthma, NICM ( LVEF 31%), LBBB who presented to [facility] for elective BiV ICD implantation. On 3/19/2025 patient underwent successful biventricular defibrillator implant ( LEADR trial LBAP ICD lead)  implantation. Patient was monitored overnight. On POD 1 patient remains HD stable with no complaints. Examination of ICD pocket is C/D/I with no hematoma or erythema. Device interrogation reveals normal device function and CXR was negative for pneumothorax. Patient is being DC home in stable condition. Patient is a 69y Female with PMHx of pre-DM, AYLEEN on Cpap, asthma, NICM ( LVEF 31%), LBBB who presented to [facility] for elective BiV ICD implantation. On 3/19/2025 patient underwent successful biventricular defibrillator   implantation. The patient was consented into the LEADR LBBAP clinical trial. This study is being conducted under the existing [place] FDA Investigational Device Exemption (OMAR) for the Next Generation ICD Lead and is designed to confirm the safety and defibrillation efficacy of the Next Generation ICD Lead when placed in the LBBAP location in ICD and LOT-CRT patient population. LEADR LBBAP can be found on clinicaltrials.gov under Study NCT Number- [id number]. The ICF version v3.0; [id number], IRB approved [id number], was fully reviewed with the patient, discussed risks and benefits of participation, and all of the patient’s questions and concerns were addressed. The patient was agreeable and a copy of the signed ICF version v3.0, was given to the patient, a copy was also scanned into the patient's chart / EMR. Patient is aware that they can contact physician for any issues or questions as well as the study team: Dilcia Leija at [phone] or Apurva Buck [phone].".    Patient was monitored overnight. On POD 1 patient remains HD stable with no complaints. s/p Vanc IV x2 doses post op. Examination of ICD pocket is C/D/I with no hematoma or erythema. Device interrogation reveals normal device function and CXR was negative for pneumothorax. Patient is being DC home in stable condition.

## 2025-03-19 NOTE — DISCHARGE NOTE PROVIDER - NSDCCPCAREPLAN_GEN_ALL_CORE_FT
PRINCIPAL DISCHARGE DIAGNOSIS  Diagnosis: Non-ischemic cardiomyopathy  Assessment and Plan of Treatment:

## 2025-03-19 NOTE — CHART NOTE - NSCHARTNOTEFT_GEN_A_CORE
Electrophysiology Brief Post-Operative Note    I have personally seen and examined the patient.  I agree with the history and physical which I have reviewed and noted any changes below.      DEVICE COMPANY:  -[employer]    PRE-OP DIAGNOSIS: Chronic systolic Heart Failure, LBBB    POST-OP DIAGNOSIS: Chronic systolic Heart Failure, LBBB    PROCEDURE:  Biventricular defibrillator implant    Physician: MEL Lafleur MD  Assistant: None    ANESTHESIA TYPE:  [  ]General Anesthesia  [X] Sedation  [X] Local/Regional    CONDITION  [  ] Critical  [  ] Serious  [  ]Fair  [X]Good    SPECIMENS REMOVED (IF APPLICABLE): None    IMPLANTS (IF APPLICABLE)  Biventricular Defibrillator implant    FINDINGS (see below)   -Successful biventricular defibrillator implant: LEADR trial LBAP ICD lead   -No immediate complications   -Estimated Blood Loss: 25  mL   -Contrast used: 10 cc    PLAN OF CARE  - Vancomyocin 1g IV q12 h for 2 doses  - Chest X-ray portable now  - Chest X-ray PA/Lat tomorrow at 6am  - Device interrogation tomorrow in am  - Discontinue Heparin, Lovenox, and NOACS for 48 hours  - No anticoagulation unless discussed with EP attending      FOLLOW-UP  -Outpatient follow-up with Electrophysiology in 3-4 weeks     [address] [place] [address] ([address])[id number] [phone]
PACU ANESTHESIA ADMISSION NOTE          __x__  Patent Airway    __x__  Full return of protective reflexes    __x__  Full recovery from anesthesia / back to baseline status    Vitals:  T(C): 36.8 (03-19-25 @ 12:53), Max: 36.8 (03-19-25 @ 11:00)  HR: 59 (03-19-25 @ 12:53) (59 - 59)  BP: 133/47 (03-19-25 @ 12:53) (133/47 - 133/47)  RR: 14 (03-19-25 @ 12:53) (14 - 14)  SpO2: 99% (03-19-25 @ 12:53) (99% - 99%)    Mental Status:  __x__ Awake   ___x__ Alert   _____ Drowsy   _____ Sedated    Nausea/Vomiting:  __x__ NO  ______Yes,   See Post - Op Orders          Pain Scale (0-10):  _____    Treatment: ____ None    __x__ See Post - Op/PCA Orders    Post - Operative Fluids:   ____ Oral   __x__ See Post - Op Orders    Plan: Discharge:   ____Home       __x___Floor     _____Critical Care    _____  Other:_________________    Comments: Patient had smooth intraoperative event, no anesthesia complication.  PACU Vital signs: HR:            BP:        /          RR:             O2 Sat:       %     Temp
I saw and examined patient and I reviewed his chart and blood work. I attest that there has been no clinical change in patient's condition since last assessment documented in H&P, consult, or last office visit.

## 2025-03-19 NOTE — ASU PATIENT PROFILE, ADULT - FALL HARM RISK - UNIVERSAL INTERVENTIONS
Bed in lowest position, wheels locked, appropriate side rails in place/Call bell, personal items and telephone in reach/Instruct patient to call for assistance before getting out of bed or chair/Non-slip footwear when patient is out of bed/[place] to call system/Physically safe environment - no spills, clutter or unnecessary equipment/Purposeful Proactive Rounding/Room/bathroom lighting operational, light cord in reach

## 2025-03-19 NOTE — DISCHARGE NOTE PROVIDER - CARE PROVIDER_API CALL
Kev Lafleur  Cardiac Electrophysiology  [address] [place] [address], [address]  [place] [address]  Phone: [phone]  Fax: [phone]  Follow Up Time:    Kev Lafleur  Cardiac Electrophysiology  [address] [place] [address], [address]  [place] [address]  Phone: [phone]  Fax: [phone]  Follow Up Time: 1 month

## 2025-03-19 NOTE — DISCHARGE NOTE PROVIDER - NSDCCPTREATMENT_GEN_ALL_CORE_FT
PRINCIPAL PROCEDURE  Procedure: Insertion, ICD, biventricular  Findings and Treatment: - Please take Keflex 500 mg (Bactrim / doxycycline 100 mg) twice daily for 5 days.  - Do not submerge in water (example: baths, swimming) for 1 month.  - Do not lift your left arm greater than shoulder height for 6 weeks.  - Do not lift anything heavier than 5-10 lbs with your left arm for 6 weeks.  - Any sudden swelling, redness, fever, discharge, or severe pain, call the Electrophysiology Office at [phone].     PRINCIPAL PROCEDURE  Procedure: Insertion, ICD, biventricular  Findings and Treatment: - Do not get area wet for 5 days   - Take top dressing off after 5 days   - Leave steristrips in place, until they fall off on their own  -  Do not submerge in water (example: baths, swimming) for 1 month.  - Do not lift your left arm greater than shoulder height for 6 weeks.  - Do not lift anything heavier than 5-10 lbs with your left arm for 6 weeks.  - Any sudden swelling, redness, fever, discharge, or severe pain, call the Electrophysiology Office at [phone].

## 2025-03-19 NOTE — PATIENT PROFILE ADULT - NSPROGENPREVTRANSF_GEN_A_NUR
[Negative] : Psychiatric [Fatigue: Grade 0] : Fatigue: Grade 0 [Skin Induration: Grade 1 - Mild induration, able to move skin parallel to plane (sliding) and perpendicular to skin (pinching up)] : Skin Induration: Grade 1 - Mild induration, able to move skin parallel to plane (sliding) and perpendicular to skin (pinching up) [Joint Pain] : no joint pain [Muscle Pain] : no muscle pain [FreeTextEntry9] : BCC to right tibia [Muscle Weakness] : no muscle weakness no history of blood product transfusion

## 2025-03-19 NOTE — DISCHARGE NOTE PROVIDER - PROVIDER TOKENS
MEDICATIONS  (STANDING):  benztropine 1 milliGRAM(s) Oral two times a day  bisacodyl 10 milliGRAM(s) Oral at bedtime  cholecalciferol 1000 Unit(s) Oral at bedtime  ferrous    sulfate 325 milliGRAM(s) Oral daily    MEDICATIONS  (PRN):  acetaminophen     Tablet .. 650 milliGRAM(s) Oral every 4 hours PRN Mild Pain (1 - 3), Moderate Pain (4 - 6)  OLANZapine Disintegrating Tablet 5 milliGRAM(s) Oral every 4 hours PRN agitation  OLANZapine Injectable 5 milliGRAM(s) IntraMuscular once PRN Agitation   PROVIDER:[TOKEN:[[id number]]] PROVIDER:[TOKEN:[[id number]],FOLLOWUP:[1 month]]

## 2025-03-19 NOTE — DISCHARGE NOTE PROVIDER - NSDCFUSCHEDAPPT_GEN_ALL_CORE_FT
[facility] Physician [facility]  ELECTROPH [address]  Scheduled Appointment: 04/07/2025    Shanita Love  [facility]  CARDIOLOGY [address]  Scheduled Appointment: 05/07/2025    Kev Lafleur  [facility]  ELECTROPH [address]  Scheduled Appointment: 05/13/2025

## 2025-03-20 ENCOUNTER — TRANSCRIPTION ENCOUNTER (OUTPATIENT)
Age: 70
End: 2025-03-20

## 2025-03-20 VITALS — HEART RATE: 73 BPM | SYSTOLIC BLOOD PRESSURE: 109 MMHG | DIASTOLIC BLOOD PRESSURE: 56 MMHG

## 2025-03-20 PROCEDURE — 71046 X-RAY EXAM CHEST 2 VIEWS: CPT | Mod: 26

## 2025-03-20 PROCEDURE — 99024 POSTOP FOLLOW-UP VISIT: CPT

## 2025-03-20 PROCEDURE — 99233 SBSQ HOSP IP/OBS HIGH 50: CPT | Mod: FS

## 2025-03-20 PROCEDURE — 93010 ELECTROCARDIOGRAM REPORT: CPT

## 2025-03-20 PROCEDURE — 93284 PRGRMG EVAL IMPLANTABLE DFB: CPT | Mod: 26

## 2025-03-20 RX ORDER — METOPROLOL SUCCINATE 50 MG/1
1 TABLET, EXTENDED RELEASE ORAL
Qty: 0 | Refills: 0 | DISCHARGE
Start: 2025-03-20

## 2025-03-20 RX ADMIN — DAPAGLIFLOZIN 10 MILLIGRAM(S): 5 TABLET, FILM COATED ORAL at 11:39

## 2025-03-20 RX ADMIN — Medication 250 MILLIGRAM(S): at 12:13

## 2025-03-20 RX ADMIN — Medication 1 APPLICATION(S): at 05:11

## 2025-03-20 RX ADMIN — METOPROLOL SUCCINATE 25 MILLIGRAM(S): 50 TABLET, EXTENDED RELEASE ORAL at 05:51

## 2025-03-20 RX ADMIN — Medication 12.5 MILLIGRAM(S): at 05:51

## 2025-03-20 RX ADMIN — Medication 250 MILLIGRAM(S): at 00:28

## 2025-03-20 RX ADMIN — SACUBITRIL AND VALSARTAN 1 TABLET(S): 49; 51 TABLET, FILM COATED ORAL at 05:51

## 2025-03-20 NOTE — PROGRESS NOTE ADULT - SUBJECTIVE AND OBJECTIVE BOX
INTERVAL HPI/OVERNIGHT EVENTS:  No acute events overnight  Patient S/P BiV AICD Implant POD#1  HD Stable    MEDICATIONS  (STANDING):  chlorhexidine 2% Cloths 1 Application(s) Topical <User Schedule>  dapagliflozin 10 milliGRAM(s) Oral daily  metoprolol succinate ER 25 milliGRAM(s) Oral daily  sacubitril 24 mG/valsartan 26 mG 1 Tablet(s) Oral two times a day  spironolactone 12.5 milliGRAM(s) Oral daily  vancomycin  IVPB 1000 milliGRAM(s) IV Intermittent once    MEDICATIONS  (PRN):  furosemide    Tablet 20 milliGRAM(s) Oral daily PRN for shortness of breath and/or wheezing      Allergies    No Known Allergies    Intolerances        REVIEW OF SYSTEMS: No CP, palpitations, dizziness or SOB     Vital Signs Last 24 Hrs  T(C): 36.8 (20 Mar 2025 07:18), Max: 36.8 (19 Mar 2025 11:00)  T(F): 98.3 (20 Mar 2025 07:18), Max: 98.3 (20 Mar 2025 07:18)  HR: 60 (20 Mar 2025 07:18) (59 - 102)  BP: 129/55 (20 Mar 2025 07:18) (92/55 - 143/66)  BP(mean): 79 (20 Mar 2025 07:18) (55 - 95)  RR: 18 (20 Mar 2025 07:18) (14 - 24)  SpO2: 96% (20 Mar 2025 07:18) (95% - 99%)    Parameters below as of 20 Mar 2025 07:18  Patient On (Oxygen Delivery Method): room air        Physical Exam  GENERAL: In no apparent distress, well nourished, and hydrated.  EYES: EOMI, PERRLA, conjunctiva and sclera clear  NECK: Supple  HEART: Regular rate and rhythm; No murmurs, rubs, or gallops.  PULMONARY: Clear to auscultation and perfusion.  No rales, wheezing, or rhonchi bilaterally.  EXTREMITIES:  2+ Peripheral Pulses, No clubbing, cyanosis, or edema  SKIN: Dressing over L chest wall, no hematoma  NEUROLOGICAL: Grossly nonfocal      LABS:                RADIOLOGY & ADDITIONAL TESTS:

## 2025-03-20 NOTE — DISCHARGE NOTE NURSING/CASE MANAGEMENT/SOCIAL WORK - PATIENT PORTAL LINK FT
You can access the FollowMyHealth Patient Portal offered by [facility] by registering at the following website: [email or web address]. By joining [employer]’s FollowMyHealth portal, you will also be able to view your health information using other applications (apps) compatible with our system.

## 2025-03-20 NOTE — PROGRESS NOTE ADULT - ASSESSMENT
69y Female with PMHx of pre-DM, AYLEEN on Cpap, asthma, NICM ( LVEF 31%), LBBB who presented to [facility] for elective BiV ICD implantation. POD#1 and feeling well    Impression:  NICM with LBBB sp BiV AICD ([employer])  AYLEEN on CPAP  Pre DM    Plan:  - CXR completed  - Interrogation completed, numbers within appropriate range  - No anticoagulation 48 hours postop  - Continue all other home medications  - One more dose of IV vanco at 12pm before DC  - Do not lift L arm above shoulder height or lift > 5 lbs for 6 weeks  - Do not get area wet for 5 days  - Remove dressing in 5 days  - Leave steri strips in place until they fall off on their own  - Follow up with Dr Lafleur in one month   69y Female with PMHx of pre-DM, AYLEEN on Cpap, asthma, NICM ( LVEF 31%), LBBB who presented to [facility] for elective BiV ICD implantation. POD#1 and feeling well    Impression:  NICM with LBBB sp BiV AICD ([employer]) LEADR trial  AYLEEN on CPAP  Pre DM    Plan:  - CXR completed  - Interrogation completed, numbers within appropriate range  - No anticoagulation 48 hours postop  - Continue all other home medications  - One more dose of IV vanco at 12pm before DC  - Do not lift L arm above shoulder height or lift > 5 lbs for 6 weeks  - Do not get area wet for 5 days  - Remove dressing in 5 days  - Leave steri strips in place until they fall off on their own  - Follow up with Dr Lafleur in one month

## 2025-03-20 NOTE — DISCHARGE NOTE NURSING/CASE MANAGEMENT/SOCIAL WORK - NSDCPEFALRISK_GEN_ALL_CORE
For information on Fall & Injury Prevention, visit: [email or web address] OR  [email or web address] OR  https://www.cdc.gov/steadi/patient.html

## 2025-03-20 NOTE — PROGRESS NOTE ADULT - NS ATTEND AMEND GEN_ALL_CORE FT
Complex patient  s/p BiV ICD implant (ICD lead in left bundle area - LEADR trial)  no complications        The patient was consented into the LEADR LBBAP clinical trial. This study is being conducted under the existing [place] FDA Investigational Device Exemption (OMAR) for the Next Generation ICD Lead and is designed to confirm the safety and defibrillation efficacy of the Next Generation ICD Lead when placed in the LBBAP location in ICD and LOT-CRT patient population. LEADR LBBAP can be found on clinicaltrials.gov under Study NCT Number- [id number]. The ICF version v3.0; [id number], IRB approved [id number], was fully reviewed with the patient, discussed risks and benefits of participation, and all of the patient’s questions and concerns were addressed. The patient was agreeable and a copy of the signed ICF version v3.0, was given to the patient, a copy was also scanned into the patient's chart / EMR. Patient is aware that they can contact physician for any issues or questions as well as the study team: Dilcia Leija at [phone] or Apurva Buck [phone]."

## 2025-03-20 NOTE — DISCHARGE NOTE NURSING/CASE MANAGEMENT/SOCIAL WORK - FINANCIAL ASSISTANCE
[facility] provides services at a reduced cost to those who are determined to be eligible through [facility]’s financial assistance program. Information regarding [facility]’s financial assistance program can be found by going to [email or web address] or by calling [phone].

## 2025-03-20 NOTE — DISCHARGE NOTE NURSING/CASE MANAGEMENT/SOCIAL WORK - NSDCVIVACCINE_GEN_ALL_CORE_FT
Tdap; 07-Dec-2020 12:18; Xiomy Mike (JACQUIE); [employer]; [id number] (Exp. Date: 31-Jul-2022); IntraMuscular; Deltoid Left.; 0.5 milliLiter(s); VIS (VIS Published: 09-May-2013, VIS Presented: 07-Dec-2020);

## 2025-03-21 PROBLEM — J45.909 UNSPECIFIED ASTHMA, UNCOMPLICATED: Chronic | Status: ACTIVE | Noted: 2025-03-05

## 2025-03-21 PROBLEM — I42.8 OTHER CARDIOMYOPATHIES: Chronic | Status: ACTIVE | Noted: 2025-03-05

## 2025-03-25 ENCOUNTER — TRANSCRIPTION ENCOUNTER (OUTPATIENT)
Age: 70
End: 2025-03-25

## 2025-03-26 DIAGNOSIS — R73.03 PREDIABETES: ICD-10-CM

## 2025-03-26 DIAGNOSIS — I42.8 OTHER CARDIOMYOPATHIES: ICD-10-CM

## 2025-03-26 DIAGNOSIS — J45.909 UNSPECIFIED ASTHMA, UNCOMPLICATED: ICD-10-CM

## 2025-03-26 DIAGNOSIS — I44.7 LEFT BUNDLE-BRANCH BLOCK, UNSPECIFIED: ICD-10-CM

## 2025-03-26 DIAGNOSIS — I50.22 CHRONIC SYSTOLIC (CONGESTIVE) HEART FAILURE: ICD-10-CM

## 2025-03-26 DIAGNOSIS — G47.33 OBSTRUCTIVE SLEEP APNEA (ADULT) (PEDIATRIC): ICD-10-CM

## 2025-03-26 DIAGNOSIS — Z00.6 ENCOUNTER FOR EXAMINATION FOR NORMAL COMPARISON AND CONTROL IN CLINICAL RESEARCH PROGRAM: ICD-10-CM

## 2025-04-07 ENCOUNTER — APPOINTMENT (OUTPATIENT)
Dept: ELECTROPHYSIOLOGY | Facility: CLINIC | Age: 70
End: 2025-04-07
Payer: MEDICARE

## 2025-04-07 ENCOUNTER — NON-APPOINTMENT (OUTPATIENT)
Age: 70
End: 2025-04-07

## 2025-04-07 VITALS
SYSTOLIC BLOOD PRESSURE: 98 MMHG | BODY MASS INDEX: 32.78 KG/M2 | HEIGHT: 63 IN | HEART RATE: 73 BPM | WEIGHT: 185 LBS | DIASTOLIC BLOOD PRESSURE: 50 MMHG

## 2025-04-07 DIAGNOSIS — I42.8 OTHER CARDIOMYOPATHIES: ICD-10-CM

## 2025-04-07 DIAGNOSIS — Z45.02 ENCOUNTER FOR ADJUSTMENT AND MANAGEMENT OF AUTOMATIC IMPLANTABLE CARDIAC DEFIBRILLATOR: ICD-10-CM

## 2025-04-07 DIAGNOSIS — I44.7 LEFT BUNDLE-BRANCH BLOCK, UNSPECIFIED: ICD-10-CM

## 2025-04-07 DIAGNOSIS — I50.20 UNSPECIFIED SYSTOLIC (CONGESTIVE) HEART FAILURE: ICD-10-CM

## 2025-04-07 PROCEDURE — 99024 POSTOP FOLLOW-UP VISIT: CPT

## 2025-04-07 PROCEDURE — 93284 PRGRMG EVAL IMPLANTABLE DFB: CPT

## 2025-04-07 PROCEDURE — 93000 ELECTROCARDIOGRAM COMPLETE: CPT | Mod: 59

## 2025-05-06 PROBLEM — G47.33 OBSTRUCTIVE SLEEP APNEA (ADULT) (PEDIATRIC): Chronic | Status: ACTIVE | Noted: 2025-03-05

## 2025-05-06 PROBLEM — I50.22 CHRONIC SYSTOLIC (CONGESTIVE) HEART FAILURE: Chronic | Status: ACTIVE | Noted: 2025-03-05

## 2025-05-06 PROBLEM — R73.03 PREDIABETES: Chronic | Status: ACTIVE | Noted: 2025-03-05

## 2025-05-07 ENCOUNTER — APPOINTMENT (OUTPATIENT)
Dept: CARDIOLOGY | Facility: CLINIC | Age: 70
End: 2025-05-07

## 2025-05-12 ENCOUNTER — APPOINTMENT (OUTPATIENT)
Dept: CARDIOLOGY | Facility: CLINIC | Age: 70
End: 2025-05-12
Payer: MEDICARE

## 2025-05-12 ENCOUNTER — RESULT CHARGE (OUTPATIENT)
Age: 70
End: 2025-05-12

## 2025-05-12 ENCOUNTER — NON-APPOINTMENT (OUTPATIENT)
Age: 70
End: 2025-05-12

## 2025-05-12 VITALS
DIASTOLIC BLOOD PRESSURE: 70 MMHG | HEART RATE: 61 BPM | WEIGHT: 185 LBS | OXYGEN SATURATION: 99 % | BODY MASS INDEX: 32.78 KG/M2 | HEIGHT: 63 IN | SYSTOLIC BLOOD PRESSURE: 114 MMHG

## 2025-05-12 PROCEDURE — 99213 OFFICE O/P EST LOW 20 MIN: CPT

## 2025-05-12 PROCEDURE — 93000 ELECTROCARDIOGRAM COMPLETE: CPT

## 2025-05-12 RX ORDER — EMPAGLIFLOZIN 10 MG/1
10 TABLET, FILM COATED ORAL
Qty: 90 | Refills: 3 | Status: ACTIVE | COMMUNITY
Start: 2025-05-12 | End: 1900-01-01

## 2025-05-12 RX ORDER — DAPAGLIFLOZIN 10 MG/1
10 TABLET, FILM COATED ORAL DAILY
Refills: 0 | Status: DISCONTINUED | COMMUNITY
End: 2025-05-12

## 2025-06-18 ENCOUNTER — RESULT REVIEW (OUTPATIENT)
Age: 70
End: 2025-06-18

## 2025-06-18 ENCOUNTER — OUTPATIENT (OUTPATIENT)
Dept: OUTPATIENT SERVICES | Facility: HOSPITAL | Age: 70
LOS: 1 days | End: 2025-06-18
Payer: MEDICARE

## 2025-06-18 DIAGNOSIS — Z98.42 CATARACT EXTRACTION STATUS, LEFT EYE: Chronic | ICD-10-CM

## 2025-06-18 DIAGNOSIS — R07.9 CHEST PAIN, UNSPECIFIED: ICD-10-CM

## 2025-06-18 DIAGNOSIS — Z98.890 OTHER SPECIFIED POSTPROCEDURAL STATES: Chronic | ICD-10-CM

## 2025-06-18 DIAGNOSIS — Z90.710 ACQUIRED ABSENCE OF BOTH CERVIX AND UTERUS: Chronic | ICD-10-CM

## 2025-06-18 DIAGNOSIS — Z98.41 CATARACT EXTRACTION STATUS, RIGHT EYE: Chronic | ICD-10-CM

## 2025-06-18 DIAGNOSIS — Z98.891 HISTORY OF UTERINE SCAR FROM PREVIOUS SURGERY: Chronic | ICD-10-CM

## 2025-06-18 PROCEDURE — 71046 X-RAY EXAM CHEST 2 VIEWS: CPT

## 2025-06-18 PROCEDURE — 71046 X-RAY EXAM CHEST 2 VIEWS: CPT | Mod: 26

## 2025-06-19 DIAGNOSIS — R07.9 CHEST PAIN, UNSPECIFIED: ICD-10-CM

## 2025-06-23 ENCOUNTER — APPOINTMENT (OUTPATIENT)
Dept: ELECTROPHYSIOLOGY | Facility: CLINIC | Age: 70
End: 2025-06-23
Payer: MEDICARE

## 2025-06-23 VITALS
SYSTOLIC BLOOD PRESSURE: 103 MMHG | DIASTOLIC BLOOD PRESSURE: 70 MMHG | BODY MASS INDEX: 33.84 KG/M2 | HEART RATE: 42 BPM | HEIGHT: 63 IN | WEIGHT: 191 LBS

## 2025-06-23 PROCEDURE — 93000 ELECTROCARDIOGRAM COMPLETE: CPT | Mod: 59

## 2025-06-23 PROCEDURE — 99215 OFFICE O/P EST HI 40 MIN: CPT

## 2025-06-23 PROCEDURE — 93284 PRGRMG EVAL IMPLANTABLE DFB: CPT

## 2025-06-24 DIAGNOSIS — Z00.6 ENCOUNTER FOR EXAMINATION FOR NORMAL COMPARISON AND CONTROL IN CLINICAL RESEARCH PROGRAM: ICD-10-CM

## 2025-07-21 ENCOUNTER — RX RENEWAL (OUTPATIENT)
Age: 70
End: 2025-07-21

## 2025-07-31 ENCOUNTER — APPOINTMENT (OUTPATIENT)
Facility: CLINIC | Age: 70
End: 2025-07-31
Payer: MEDICARE

## 2025-07-31 ENCOUNTER — NON-APPOINTMENT (OUTPATIENT)
Age: 70
End: 2025-07-31

## 2025-07-31 PROCEDURE — 76512 OPH US DX B-SCAN: CPT | Mod: LT

## 2025-07-31 PROCEDURE — 99214 OFFICE O/P EST MOD 30 MIN: CPT

## 2025-07-31 PROCEDURE — 92250 FUNDUS PHOTOGRAPHY W/I&R: CPT

## 2025-08-12 ENCOUNTER — NON-APPOINTMENT (OUTPATIENT)
Age: 70
End: 2025-08-12

## 2025-08-12 ENCOUNTER — APPOINTMENT (OUTPATIENT)
Dept: CARDIOLOGY | Facility: CLINIC | Age: 70
End: 2025-08-12
Payer: MEDICARE

## 2025-08-12 PROCEDURE — 93296 REM INTERROG EVL PM/IDS: CPT

## 2025-08-12 PROCEDURE — 93295 DEV INTERROG REMOTE 1/2/MLT: CPT

## 2025-09-03 ENCOUNTER — APPOINTMENT (OUTPATIENT)
Dept: CARDIOLOGY | Facility: CLINIC | Age: 70
End: 2025-09-03
Payer: MEDICARE

## 2025-09-03 ENCOUNTER — RESULT CHARGE (OUTPATIENT)
Age: 70
End: 2025-09-03

## 2025-09-03 VITALS
SYSTOLIC BLOOD PRESSURE: 100 MMHG | WEIGHT: 191 LBS | HEART RATE: 65 BPM | BODY MASS INDEX: 33.84 KG/M2 | DIASTOLIC BLOOD PRESSURE: 60 MMHG | HEIGHT: 63 IN | OXYGEN SATURATION: 99 %

## 2025-09-03 PROCEDURE — 99214 OFFICE O/P EST MOD 30 MIN: CPT

## 2025-09-08 ENCOUNTER — RX RENEWAL (OUTPATIENT)
Age: 70
End: 2025-09-08

## 2025-09-12 ENCOUNTER — APPOINTMENT (OUTPATIENT)
Dept: CARDIOLOGY | Facility: CLINIC | Age: 70
End: 2025-09-12